# Patient Record
Sex: FEMALE | Race: WHITE | NOT HISPANIC OR LATINO
[De-identification: names, ages, dates, MRNs, and addresses within clinical notes are randomized per-mention and may not be internally consistent; named-entity substitution may affect disease eponyms.]

---

## 2018-01-31 ENCOUNTER — TRANSCRIPTION ENCOUNTER (OUTPATIENT)
Age: 38
End: 2018-01-31

## 2018-05-21 PROBLEM — Z00.00 ENCOUNTER FOR PREVENTIVE HEALTH EXAMINATION: Status: ACTIVE | Noted: 2018-05-21

## 2018-05-22 ENCOUNTER — TRANSCRIPTION ENCOUNTER (OUTPATIENT)
Age: 38
End: 2018-05-22

## 2018-05-22 ENCOUNTER — APPOINTMENT (OUTPATIENT)
Dept: ANTEPARTUM | Facility: CLINIC | Age: 38
End: 2018-05-22
Payer: COMMERCIAL

## 2018-05-22 ENCOUNTER — ASOB RESULT (OUTPATIENT)
Age: 38
End: 2018-05-22

## 2018-05-22 PROCEDURE — 76811 OB US DETAILED SNGL FETUS: CPT

## 2018-05-22 PROCEDURE — 76817 TRANSVAGINAL US OBSTETRIC: CPT | Mod: 59

## 2018-06-10 ENCOUNTER — OUTPATIENT (OUTPATIENT)
Dept: EMERGENCY DEPT | Facility: HOSPITAL | Age: 38
LOS: 1 days | End: 2018-06-10

## 2018-06-10 VITALS
OXYGEN SATURATION: 100 % | RESPIRATION RATE: 16 BRPM | DIASTOLIC BLOOD PRESSURE: 66 MMHG | SYSTOLIC BLOOD PRESSURE: 127 MMHG | TEMPERATURE: 99 F | HEART RATE: 81 BPM

## 2018-06-10 DIAGNOSIS — Z3A.00 WEEKS OF GESTATION OF PREGNANCY NOT SPECIFIED: ICD-10-CM

## 2018-06-10 DIAGNOSIS — O26.899 OTHER SPECIFIED PREGNANCY RELATED CONDITIONS, UNSPECIFIED TRIMESTER: ICD-10-CM

## 2018-06-10 NOTE — ED ADULT TRIAGE NOTE - CHIEF COMPLAINT QUOTE
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

## 2018-06-11 ENCOUNTER — TRANSCRIPTION ENCOUNTER (OUTPATIENT)
Age: 38
End: 2018-06-11

## 2018-06-11 ENCOUNTER — OUTPATIENT (OUTPATIENT)
Dept: OUTPATIENT SERVICES | Facility: HOSPITAL | Age: 38
LOS: 1 days | End: 2018-06-11

## 2018-06-11 VITALS
RESPIRATION RATE: 18 BRPM | SYSTOLIC BLOOD PRESSURE: 103 MMHG | TEMPERATURE: 98 F | HEART RATE: 90 BPM | DIASTOLIC BLOOD PRESSURE: 72 MMHG | OXYGEN SATURATION: 99 %

## 2018-06-11 DIAGNOSIS — O26.899 OTHER SPECIFIED PREGNANCY RELATED CONDITIONS, UNSPECIFIED TRIMESTER: ICD-10-CM

## 2018-06-11 DIAGNOSIS — R26.89 OTHER ABNORMALITIES OF GAIT AND MOBILITY: ICD-10-CM

## 2018-06-11 DIAGNOSIS — Z3A.00 WEEKS OF GESTATION OF PREGNANCY NOT SPECIFIED: ICD-10-CM

## 2018-06-11 LAB
APPEARANCE UR: CLEAR — SIGNIFICANT CHANGE UP
BACTERIA # UR AUTO: SIGNIFICANT CHANGE UP
BASOPHILS # BLD AUTO: 0.03 K/UL — SIGNIFICANT CHANGE UP (ref 0–0.2)
BASOPHILS NFR BLD AUTO: 0.2 % — SIGNIFICANT CHANGE UP (ref 0–2)
BILIRUB UR-MCNC: NEGATIVE — SIGNIFICANT CHANGE UP
BLD GP AB SCN SERPL QL: NEGATIVE — SIGNIFICANT CHANGE UP
BLOOD UR QL VISUAL: NEGATIVE — SIGNIFICANT CHANGE UP
COLOR SPEC: SIGNIFICANT CHANGE UP
EOSINOPHIL # BLD AUTO: 0.07 K/UL — SIGNIFICANT CHANGE UP (ref 0–0.5)
EOSINOPHIL NFR BLD AUTO: 0.5 % — SIGNIFICANT CHANGE UP (ref 0–6)
FIBRINOGEN PPP-MCNC: 683 MG/DL — HIGH (ref 310–510)
GLUCOSE UR-MCNC: NEGATIVE — SIGNIFICANT CHANGE UP
HCT VFR BLD CALC: 33.2 % — LOW (ref 34.5–45)
HGB BLD-MCNC: 10.8 G/DL — LOW (ref 11.5–15.5)
IMM GRANULOCYTES # BLD AUTO: 0.11 # — SIGNIFICANT CHANGE UP
IMM GRANULOCYTES NFR BLD AUTO: 0.7 % — SIGNIFICANT CHANGE UP (ref 0–1.5)
KETONES UR-MCNC: NEGATIVE — SIGNIFICANT CHANGE UP
LEUKOCYTE ESTERASE UR-ACNC: HIGH
LYMPHOCYTES # BLD AUTO: 18.5 % — SIGNIFICANT CHANGE UP (ref 13–44)
LYMPHOCYTES # BLD AUTO: 2.78 K/UL — SIGNIFICANT CHANGE UP (ref 1–3.3)
MCHC RBC-ENTMCNC: 28.3 PG — SIGNIFICANT CHANGE UP (ref 27–34)
MCHC RBC-ENTMCNC: 32.5 % — SIGNIFICANT CHANGE UP (ref 32–36)
MCV RBC AUTO: 87.1 FL — SIGNIFICANT CHANGE UP (ref 80–100)
MONOCYTES # BLD AUTO: 0.78 K/UL — SIGNIFICANT CHANGE UP (ref 0–0.9)
MONOCYTES NFR BLD AUTO: 5.2 % — SIGNIFICANT CHANGE UP (ref 2–14)
MUCOUS THREADS # UR AUTO: SIGNIFICANT CHANGE UP
NEUTROPHILS # BLD AUTO: 11.28 K/UL — HIGH (ref 1.8–7.4)
NEUTROPHILS NFR BLD AUTO: 74.9 % — SIGNIFICANT CHANGE UP (ref 43–77)
NITRITE UR-MCNC: NEGATIVE — SIGNIFICANT CHANGE UP
NRBC # FLD: 0 — SIGNIFICANT CHANGE UP
PH UR: 6.5 — SIGNIFICANT CHANGE UP (ref 4.6–8)
PLATELET # BLD AUTO: 242 K/UL — SIGNIFICANT CHANGE UP (ref 150–400)
PMV BLD: 11.1 FL — SIGNIFICANT CHANGE UP (ref 7–13)
PROT UR-MCNC: NEGATIVE MG/DL — SIGNIFICANT CHANGE UP
RBC # BLD: 3.81 M/UL — SIGNIFICANT CHANGE UP (ref 3.8–5.2)
RBC # FLD: 14.6 % — HIGH (ref 10.3–14.5)
RBC CASTS # UR COMP ASSIST: SIGNIFICANT CHANGE UP (ref 0–?)
RH IG SCN BLD-IMP: POSITIVE — SIGNIFICANT CHANGE UP
SP GR SPEC: 1.01 — SIGNIFICANT CHANGE UP (ref 1–1.04)
SQUAMOUS # UR AUTO: SIGNIFICANT CHANGE UP
T PALLIDUM AB TITR SER: NEGATIVE — SIGNIFICANT CHANGE UP
UROBILINOGEN FLD QL: NORMAL MG/DL — SIGNIFICANT CHANGE UP
WBC # BLD: 15.05 K/UL — HIGH (ref 3.8–10.5)
WBC # FLD AUTO: 15.05 K/UL — HIGH (ref 3.8–10.5)
WBC UR QL: HIGH (ref 0–?)

## 2018-06-11 RX ORDER — SODIUM CHLORIDE 9 MG/ML
1000 INJECTION, SOLUTION INTRAVENOUS
Qty: 0 | Refills: 0 | Status: DISCONTINUED | OUTPATIENT
Start: 2018-06-11 | End: 2018-06-11

## 2018-06-11 RX ADMIN — SODIUM CHLORIDE 250 MILLILITER(S): 9 INJECTION, SOLUTION INTRAVENOUS at 19:25

## 2018-06-11 RX ADMIN — SODIUM CHLORIDE 250 MILLILITER(S): 9 INJECTION, SOLUTION INTRAVENOUS at 09:55

## 2018-06-11 NOTE — ED PROVIDER NOTE - MEDICAL DECISION MAKING DETAILS
38 yo woman w/ fall. Only injuries noted are minor facial abrasions. Will check urine. Will require fetal monitoring in L+D. This patient had minor head injury (LOC or Amnesia to the event or Confusion) always with GCS 15. Because the pt doesn't have a HA, vomiting, is <64y/o, has no evidence of intox, no retrograde amnesia, no signs of basilar or depressed skull fracture, there is no need to CT the head based upon the Gonzales CT Head Rule. Because the pt is A&Ox3, has no focal neuro deficits, no posterior midline c-spine ttp, no evidence of intoxication and has no painful distracting injuries there is no need to obtain radiographic studies to evaluate the cervical spine based upon NEXUS Criteria.

## 2018-06-11 NOTE — ED ADULT NURSE NOTE - OBJECTIVE STATEMENT
pt on bed aox3 c/o pain on the nose, face and abdomen s/p fall, from the EMS truck, landed on her face/abdomen. abrasion noted on the nose, reports epistaxis that stops,  Pt is 24 weeks pregnant Denies Vaginal bleeding abdominal cramping MD at bedside eval the pt. will monitor

## 2018-06-11 NOTE — ED PROVIDER NOTE - PMH
Herniated disc, cervical    Hypothyroidism    Morbid obesity    Sleep apnea  has sleep studies in Jan 2015 and CPAP was recommended but Pt does not uses it

## 2018-06-11 NOTE — DISCHARGE NOTE ADULT - PATIENT PORTAL LINK FT
You can access the [facility] Patient Portal, offered by [facility], by registering with the following website: [email or web address]

## 2018-06-11 NOTE — DISCHARGE NOTE ADULT - CARE PROVIDER_API CALL
Bhupnider Bob), Obstetrics and Gynecology  [address] [place] [address]  Phone: [phone]  Fax: [phone]

## 2018-06-11 NOTE — ED PROVIDER NOTE - ENMT, MLM
Airway patent. No stridor. Minor abrasion on inner lower lip. Normal teeth alignment. No fractured teeth.

## 2018-06-11 NOTE — DISCHARGE NOTE ADULT - CARE PLAN
Principal Discharge DX:	Fall, initial encounter  Goal:	return to normal ADLs  Assessment and plan of treatment:	Regular diet as tolerated, regular activity as tolerated, no heavy lifting. Take tylenol as needed for pain control. Call your doctor and return to L&D if you have vaginal bleeding, LOF, or painful contractions.

## 2018-06-11 NOTE — ED PROVIDER NOTE - ATTENDING CONTRIBUTION TO CARE
36 y/o F  at 24 weeks gestation, h/o hypothyroidism here after mechanical fall.  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  Upon stepping down to the ground pt states she lost her balance and fell forwards, landing on her abdomen and face.  No LOC. 36 y/o F  at 24 weeks gestation, h/o hypothyroidism here after mechanical fall.  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  Upon stepping down to the ground pt states she lost her balance and fell forwards, landing on her abdomen and face.  No LOC.  pt was able to stand with assistance, weight bearing and walking since the fall.  Pt sustained abrasion to nose and states she bit the inside of her lower lip.  No abd pain, n/v, vaginal bleeding, headache, neck pain, weakness, or numbness.  Well appearing, sitting comfortably in stretcher, awake and alert, nontoxic.  VSS.  NCAT, EOMI, PERRl.  (+)abrasion to bridge and tip of nose, no epistaxis, no septal hematoma.  (+)small abrasion to chin, (+)small abrasion to lower inner lip, no laceration.  Teeth is intact, no malocclusion.  Facial bones intact, no tenderness, no laxity.  Neck is supple, FROM, no midline spinal tenderness.  Lungs cta bl.  Cards nl S1/S2, RRR, no MRG.  Abd soft ntnd.  Pelvis is stable, extremities with FROM and no gross deformities.  Gait is normal, no focal neuro deficits.  Pt with superficial abrasions, no concerns for other injuries.  She is UTD on tetanus, declines pain meds.  Will transfer to L&D for fetal evaluation.

## 2018-06-11 NOTE — DISCHARGE NOTE ADULT - PLAN OF CARE
return to normal ADLs Regular diet as tolerated, regular activity as tolerated, no heavy lifting. Take tylenol as needed for pain control. Call your doctor and return to L&D if you have vaginal bleeding, LOF, or painful contractions.

## 2018-06-11 NOTE — ED PROVIDER NOTE - MUSCULOSKELETAL, MLM
No spinal midline tenderness with palpation. All joints ranged without deformity or tenderness. No snuffbox tenderness in b/l wrists.

## 2018-06-11 NOTE — ED ADULT NURSE NOTE - CHIEF COMPLAINT QUOTE
employee-s/p mechanical fall out of ambulance, pt ~24 weeks pregnant, landed face down on abdomen, noted bleeding & swelling to bridge of nose and chin with laceration to inner bottom lip- pt denies LOC, blood thinner usage visual changes, abdominal pain, vaginal discharge/bleeding- awake a/ox3- L&D contacted, will stay in ED

## 2018-06-11 NOTE — ED PROVIDER NOTE - OBJECTIVE STATEMENT
38 yo woman  24wks pregnant, hypothyroid p/w fall. 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   OB: Bhupinder Bob 36 yo woman  24wks pregnant, hypothyroid p/w fall. 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 States she did not lose consciousness and was able to get up and walk afterward. Complaining of facial lacerations and pain in R wrist. Denies LOC, vomiting, abd pain, vaginal bleeding. Last tetanus 2 yrs ago.  OB: Bhupinder Bob

## 2018-06-11 NOTE — ED ADULT NURSE NOTE - NS ED NURSE NOTE DISPO AOU DETAILS FT
Spoken to L&D RN in triage. Pt to be transferred upstairs for fetal monitoring. Pt comfortable. no acute distress.

## 2018-06-12 LAB — SPECIMEN SOURCE: SIGNIFICANT CHANGE UP

## 2018-06-13 LAB — GP B STREP GENITAL QL CULT: SIGNIFICANT CHANGE UP

## 2018-07-24 PROBLEM — E03.9 HYPOTHYROIDISM, UNSPECIFIED: Chronic | Status: ACTIVE | Noted: 2018-06-11

## 2018-07-31 ENCOUNTER — ASOB RESULT (OUTPATIENT)
Age: 38
End: 2018-07-31

## 2018-07-31 ENCOUNTER — APPOINTMENT (OUTPATIENT)
Dept: MATERNAL FETAL MEDICINE | Facility: CLINIC | Age: 38
End: 2018-07-31
Payer: COMMERCIAL

## 2018-07-31 VITALS — BODY MASS INDEX: 37.11 KG/M2 | WEIGHT: 189 LBS | HEIGHT: 60 IN

## 2018-07-31 PROCEDURE — G0109 DIAB MANAGE TRN IND/GROUP: CPT

## 2018-08-01 ENCOUNTER — ASOB RESULT (OUTPATIENT)
Age: 38
End: 2018-08-01

## 2018-08-01 ENCOUNTER — APPOINTMENT (OUTPATIENT)
Dept: MATERNAL FETAL MEDICINE | Facility: CLINIC | Age: 38
End: 2018-08-01
Payer: COMMERCIAL

## 2018-08-01 PROCEDURE — G0109 DIAB MANAGE TRN IND/GROUP: CPT

## 2018-08-07 ENCOUNTER — APPOINTMENT (OUTPATIENT)
Dept: MATERNAL FETAL MEDICINE | Facility: CLINIC | Age: 38
End: 2018-08-07
Payer: COMMERCIAL

## 2018-08-07 ENCOUNTER — ASOB RESULT (OUTPATIENT)
Age: 38
End: 2018-08-07

## 2018-08-07 VITALS — WEIGHT: 198 LBS | BODY MASS INDEX: 38.67 KG/M2

## 2018-08-07 PROCEDURE — G0108 DIAB MANAGE TRN  PER INDIV: CPT

## 2018-08-21 ENCOUNTER — APPOINTMENT (OUTPATIENT)
Dept: MATERNAL FETAL MEDICINE | Facility: CLINIC | Age: 38
End: 2018-08-21
Payer: COMMERCIAL

## 2018-08-21 ENCOUNTER — ASOB RESULT (OUTPATIENT)
Age: 38
End: 2018-08-21

## 2018-08-21 PROCEDURE — G0108 DIAB MANAGE TRN  PER INDIV: CPT

## 2018-09-06 ENCOUNTER — ASOB RESULT (OUTPATIENT)
Age: 38
End: 2018-09-06

## 2018-09-06 ENCOUNTER — APPOINTMENT (OUTPATIENT)
Dept: ANTEPARTUM | Facility: CLINIC | Age: 38
End: 2018-09-06
Payer: COMMERCIAL

## 2018-09-06 ENCOUNTER — APPOINTMENT (OUTPATIENT)
Dept: MATERNAL FETAL MEDICINE | Facility: CLINIC | Age: 38
End: 2018-09-06
Payer: COMMERCIAL

## 2018-09-06 PROCEDURE — G0108 DIAB MANAGE TRN  PER INDIV: CPT

## 2018-09-06 PROCEDURE — 76816 OB US FOLLOW-UP PER FETUS: CPT

## 2018-09-06 PROCEDURE — 76819 FETAL BIOPHYS PROFIL W/O NST: CPT

## 2018-09-13 ENCOUNTER — OUTPATIENT (OUTPATIENT)
Dept: OUTPATIENT SERVICES | Facility: HOSPITAL | Age: 38
LOS: 1 days | End: 2018-09-13

## 2018-09-13 DIAGNOSIS — Z34.83 ENCOUNTER FOR SUPERVISION OF OTHER NORMAL PREGNANCY, THIRD TRIMESTER: ICD-10-CM

## 2018-09-13 LAB
APPEARANCE UR: CLEAR — SIGNIFICANT CHANGE UP
BACTERIA # UR AUTO: NEGATIVE — SIGNIFICANT CHANGE UP
BILIRUB UR-MCNC: NEGATIVE — SIGNIFICANT CHANGE UP
BLD GP AB SCN SERPL QL: NEGATIVE — SIGNIFICANT CHANGE UP
BLOOD UR QL VISUAL: NEGATIVE — SIGNIFICANT CHANGE UP
BUN SERPL-MCNC: 6 MG/DL — LOW (ref 7–23)
CALCIUM SERPL-MCNC: 8.9 MG/DL — SIGNIFICANT CHANGE UP (ref 8.4–10.5)
CHLORIDE SERPL-SCNC: 103 MMOL/L — SIGNIFICANT CHANGE UP (ref 98–107)
CO2 SERPL-SCNC: 23 MMOL/L — SIGNIFICANT CHANGE UP (ref 22–31)
COLOR SPEC: YELLOW — SIGNIFICANT CHANGE UP
CREAT SERPL-MCNC: 0.69 MG/DL — SIGNIFICANT CHANGE UP (ref 0.5–1.3)
GLUCOSE SERPL-MCNC: 60 MG/DL — LOW (ref 70–99)
GLUCOSE UR-MCNC: NEGATIVE — SIGNIFICANT CHANGE UP
HCT VFR BLD CALC: 31 % — LOW (ref 34.5–45)
HGB BLD-MCNC: 9.1 G/DL — LOW (ref 11.5–15.5)
HYALINE CASTS # UR AUTO: NEGATIVE — SIGNIFICANT CHANGE UP
KETONES UR-MCNC: NEGATIVE — SIGNIFICANT CHANGE UP
LEUKOCYTE ESTERASE UR-ACNC: SIGNIFICANT CHANGE UP
MCHC RBC-ENTMCNC: 24.2 PG — LOW (ref 27–34)
MCHC RBC-ENTMCNC: 29.4 % — LOW (ref 32–36)
MCV RBC AUTO: 82.4 FL — SIGNIFICANT CHANGE UP (ref 80–100)
NITRITE UR-MCNC: NEGATIVE — SIGNIFICANT CHANGE UP
NRBC # FLD: 0 — SIGNIFICANT CHANGE UP
PH UR: 7.5 — SIGNIFICANT CHANGE UP (ref 5–8)
PLATELET # BLD AUTO: 266 K/UL — SIGNIFICANT CHANGE UP (ref 150–400)
PMV BLD: 12.5 FL — SIGNIFICANT CHANGE UP (ref 7–13)
POTASSIUM SERPL-MCNC: 3.8 MMOL/L — SIGNIFICANT CHANGE UP (ref 3.5–5.3)
POTASSIUM SERPL-SCNC: 3.8 MMOL/L — SIGNIFICANT CHANGE UP (ref 3.5–5.3)
PROT UR-MCNC: 20 — SIGNIFICANT CHANGE UP
RBC # BLD: 3.76 M/UL — LOW (ref 3.8–5.2)
RBC # FLD: 15.2 % — HIGH (ref 10.3–14.5)
RBC CASTS # UR COMP ASSIST: SIGNIFICANT CHANGE UP (ref 0–?)
RH IG SCN BLD-IMP: POSITIVE — SIGNIFICANT CHANGE UP
SODIUM SERPL-SCNC: 138 MMOL/L — SIGNIFICANT CHANGE UP (ref 135–145)
SP GR SPEC: 1.02 — SIGNIFICANT CHANGE UP (ref 1–1.04)
SQUAMOUS # UR AUTO: SIGNIFICANT CHANGE UP
UROBILINOGEN FLD QL: NORMAL — SIGNIFICANT CHANGE UP
WBC # BLD: 12.15 K/UL — HIGH (ref 3.8–10.5)
WBC # FLD AUTO: 12.15 K/UL — HIGH (ref 3.8–10.5)
WBC UR QL: SIGNIFICANT CHANGE UP (ref 0–?)

## 2018-09-14 LAB — T PALLIDUM AB TITR SER: NEGATIVE — SIGNIFICANT CHANGE UP

## 2018-09-17 ENCOUNTER — TRANSCRIPTION ENCOUNTER (OUTPATIENT)
Age: 38
End: 2018-09-17

## 2018-09-17 ENCOUNTER — INPATIENT (INPATIENT)
Facility: HOSPITAL | Age: 38
LOS: 2 days | Discharge: HOME | End: 2018-09-20
Attending: SPECIALIST | Admitting: SPECIALIST

## 2018-09-17 VITALS — HEIGHT: 60 IN | WEIGHT: 207.23 LBS

## 2018-09-17 DIAGNOSIS — Z3A.00 WEEKS OF GESTATION OF PREGNANCY NOT SPECIFIED: ICD-10-CM

## 2018-09-17 DIAGNOSIS — O26.899 OTHER SPECIFIED PREGNANCY RELATED CONDITIONS, UNSPECIFIED TRIMESTER: ICD-10-CM

## 2018-09-17 LAB — GLUCOSE BLDC GLUCOMTR-MCNC: 98 MG/DL — SIGNIFICANT CHANGE UP (ref 70–99)

## 2018-09-17 RX ORDER — FAMOTIDINE 10 MG/ML
20 INJECTION INTRAVENOUS ONCE
Qty: 0 | Refills: 0 | Status: COMPLETED | OUTPATIENT
Start: 2018-09-17 | End: 2018-09-17

## 2018-09-17 RX ORDER — DIPHENHYDRAMINE HCL 50 MG
25 CAPSULE ORAL EVERY 4 HOURS
Qty: 0 | Refills: 0 | Status: DISCONTINUED | OUTPATIENT
Start: 2018-09-18 | End: 2018-09-19

## 2018-09-17 RX ORDER — CITRIC ACID/SODIUM CITRATE 300-500 MG
30 SOLUTION, ORAL ORAL ONCE
Qty: 0 | Refills: 0 | Status: DISCONTINUED | OUTPATIENT
Start: 2018-09-17 | End: 2018-09-17

## 2018-09-17 RX ORDER — NALOXONE HYDROCHLORIDE 4 MG/.1ML
0.1 SPRAY NASAL
Qty: 0 | Refills: 0 | Status: DISCONTINUED | OUTPATIENT
Start: 2018-09-18 | End: 2018-09-19

## 2018-09-17 RX ORDER — OXYCODONE HYDROCHLORIDE 5 MG/1
5 TABLET ORAL
Qty: 0 | Refills: 0 | Status: DISCONTINUED | OUTPATIENT
Start: 2018-09-18 | End: 2018-09-19

## 2018-09-17 RX ORDER — SODIUM CHLORIDE 9 MG/ML
1000 INJECTION, SOLUTION INTRAVENOUS
Qty: 0 | Refills: 0 | Status: DISCONTINUED | OUTPATIENT
Start: 2018-09-17 | End: 2018-09-17

## 2018-09-17 RX ORDER — SODIUM CHLORIDE 9 MG/ML
1000 INJECTION, SOLUTION INTRAVENOUS ONCE
Qty: 0 | Refills: 0 | Status: COMPLETED | OUTPATIENT
Start: 2018-09-17 | End: 2018-09-17

## 2018-09-17 RX ORDER — HYDROMORPHONE HYDROCHLORIDE 2 MG/ML
1 INJECTION INTRAMUSCULAR; INTRAVENOUS; SUBCUTANEOUS
Qty: 0 | Refills: 0 | Status: DISCONTINUED | OUTPATIENT
Start: 2018-09-18 | End: 2018-09-19

## 2018-09-17 RX ORDER — OXYCODONE HYDROCHLORIDE 5 MG/1
10 TABLET ORAL
Qty: 0 | Refills: 0 | Status: DISCONTINUED | OUTPATIENT
Start: 2018-09-18 | End: 2018-09-19

## 2018-09-17 RX ORDER — INFLUENZA VIRUS VACCINE 15; 15; 15; 15 UG/.5ML; UG/.5ML; UG/.5ML; UG/.5ML
0.5 SUSPENSION INTRAMUSCULAR ONCE
Qty: 0 | Refills: 0 | Status: COMPLETED | OUTPATIENT
Start: 2018-09-17 | End: 2018-09-19

## 2018-09-17 RX ORDER — ONDANSETRON 8 MG/1
4 TABLET, FILM COATED ORAL EVERY 6 HOURS
Qty: 0 | Refills: 0 | Status: DISCONTINUED | OUTPATIENT
Start: 2018-09-18 | End: 2018-09-19

## 2018-09-17 RX ORDER — METOCLOPRAMIDE HCL 10 MG
10 TABLET ORAL ONCE
Qty: 0 | Refills: 0 | Status: COMPLETED | OUTPATIENT
Start: 2018-09-17 | End: 2018-09-17

## 2018-09-17 RX ADMIN — Medication 30 MILLILITER(S): at 22:45

## 2018-09-17 RX ADMIN — Medication 10 MILLIGRAM(S): at 22:24

## 2018-09-17 RX ADMIN — FAMOTIDINE 20 MILLIGRAM(S): 10 INJECTION INTRAVENOUS at 22:24

## 2018-09-17 RX ADMIN — SODIUM CHLORIDE 2000 MILLILITER(S): 9 INJECTION, SOLUTION INTRAVENOUS at 22:25

## 2018-09-17 NOTE — DISCHARGE NOTE OB - CARE PLAN
Principal Discharge DX:	 delivery delivered  Goal:	recovery  Assessment and plan of treatment:	regular

## 2018-09-17 NOTE — DISCHARGE NOTE OB - PATIENT PORTAL LINK FT
You can access the [email or web address] Portal, offered by [facility], by registering with the following website: [email or web address]

## 2018-09-17 NOTE — DISCHARGE NOTE OB - CARE PROVIDER_API CALL
Bhupinder Bob), Obstetrics and Gynecology  [address] [place] [address]  Phone: [phone]  Fax: [phone]

## 2018-09-17 NOTE — DISCHARGE NOTE OB - MEDICATION SUMMARY - MEDICATIONS TO TAKE
I will START or STAY ON the medications listed below when I get home from the hospital:  None I will START or STAY ON the medications listed below when I get home from the hospital:    acetaminophen 325 mg oral tablet  -- 3 tab(s) by mouth every 6 hours  -- Indication: For for pain    Prenatal Multivitamins with Folic Acid 1 mg oral tablet  -- 1 tab(s) by mouth once a day  -- Indication: For supplement    levothyroxine 50 mcg (0.05 mg) oral tablet  -- 1 tab(s) by mouth once a day  -- Indication: For hypothyroid

## 2018-09-18 LAB
BASOPHILS # BLD AUTO: 0.01 K/UL — SIGNIFICANT CHANGE UP (ref 0–0.2)
BASOPHILS # BLD AUTO: 0.02 K/UL — SIGNIFICANT CHANGE UP (ref 0–0.2)
BASOPHILS NFR BLD AUTO: 0.1 % — SIGNIFICANT CHANGE UP (ref 0–2)
BASOPHILS NFR BLD AUTO: 0.1 % — SIGNIFICANT CHANGE UP (ref 0–2)
BLD GP AB SCN SERPL QL: NEGATIVE — SIGNIFICANT CHANGE UP
EOSINOPHIL # BLD AUTO: 0.02 K/UL — SIGNIFICANT CHANGE UP (ref 0–0.5)
EOSINOPHIL # BLD AUTO: 0.08 K/UL — SIGNIFICANT CHANGE UP (ref 0–0.5)
EOSINOPHIL NFR BLD AUTO: 0.1 % — SIGNIFICANT CHANGE UP (ref 0–6)
EOSINOPHIL NFR BLD AUTO: 0.6 % — SIGNIFICANT CHANGE UP (ref 0–6)
GLUCOSE BLDC GLUCOMTR-MCNC: 109 MG/DL — HIGH (ref 70–99)
GLUCOSE BLDC GLUCOMTR-MCNC: 125 MG/DL — HIGH (ref 70–99)
GLUCOSE BLDC GLUCOMTR-MCNC: 131 MG/DL — HIGH (ref 70–99)
GLUCOSE BLDC GLUCOMTR-MCNC: 135 MG/DL — HIGH (ref 70–99)
HCT VFR BLD CALC: 22.8 % — LOW (ref 34.5–45)
HCT VFR BLD CALC: 26.3 % — LOW (ref 34.5–45)
HCT VFR BLD CALC: 28.1 % — LOW (ref 34.5–45)
HGB BLD-MCNC: 7.1 G/DL — LOW (ref 11.5–15.5)
HGB BLD-MCNC: 8.1 G/DL — LOW (ref 11.5–15.5)
HGB BLD-MCNC: 8.6 G/DL — LOW (ref 11.5–15.5)
IMM GRANULOCYTES # BLD AUTO: 0.08 # — SIGNIFICANT CHANGE UP
IMM GRANULOCYTES # BLD AUTO: 0.09 # — SIGNIFICANT CHANGE UP
IMM GRANULOCYTES NFR BLD AUTO: 0.6 % — SIGNIFICANT CHANGE UP (ref 0–1.5)
IMM GRANULOCYTES NFR BLD AUTO: 0.6 % — SIGNIFICANT CHANGE UP (ref 0–1.5)
LYMPHOCYTES # BLD AUTO: 1.78 K/UL — SIGNIFICANT CHANGE UP (ref 1–3.3)
LYMPHOCYTES # BLD AUTO: 12.4 % — LOW (ref 13–44)
LYMPHOCYTES # BLD AUTO: 18.5 % — SIGNIFICANT CHANGE UP (ref 13–44)
LYMPHOCYTES # BLD AUTO: 2.45 K/UL — SIGNIFICANT CHANGE UP (ref 1–3.3)
MCHC RBC-ENTMCNC: 24.3 PG — LOW (ref 27–34)
MCHC RBC-ENTMCNC: 24.7 PG — LOW (ref 27–34)
MCHC RBC-ENTMCNC: 24.9 PG — LOW (ref 27–34)
MCHC RBC-ENTMCNC: 30.6 % — LOW (ref 32–36)
MCHC RBC-ENTMCNC: 30.8 % — LOW (ref 32–36)
MCHC RBC-ENTMCNC: 31.1 % — LOW (ref 32–36)
MCV RBC AUTO: 79.2 FL — LOW (ref 80–100)
MCV RBC AUTO: 79.4 FL — LOW (ref 80–100)
MCV RBC AUTO: 80.9 FL — SIGNIFICANT CHANGE UP (ref 80–100)
MONOCYTES # BLD AUTO: 0.68 K/UL — SIGNIFICANT CHANGE UP (ref 0–0.9)
MONOCYTES # BLD AUTO: 0.73 K/UL — SIGNIFICANT CHANGE UP (ref 0–0.9)
MONOCYTES NFR BLD AUTO: 5.1 % — SIGNIFICANT CHANGE UP (ref 2–14)
MONOCYTES NFR BLD AUTO: 5.1 % — SIGNIFICANT CHANGE UP (ref 2–14)
NEUTROPHILS # BLD AUTO: 11.77 K/UL — HIGH (ref 1.8–7.4)
NEUTROPHILS # BLD AUTO: 9.91 K/UL — HIGH (ref 1.8–7.4)
NEUTROPHILS NFR BLD AUTO: 75.1 % — SIGNIFICANT CHANGE UP (ref 43–77)
NEUTROPHILS NFR BLD AUTO: 81.7 % — HIGH (ref 43–77)
NRBC # FLD: 0 — SIGNIFICANT CHANGE UP
PLATELET # BLD AUTO: 209 K/UL — SIGNIFICANT CHANGE UP (ref 150–400)
PLATELET # BLD AUTO: 216 K/UL — SIGNIFICANT CHANGE UP (ref 150–400)
PLATELET # BLD AUTO: 220 K/UL — SIGNIFICANT CHANGE UP (ref 150–400)
PMV BLD: 11.5 FL — SIGNIFICANT CHANGE UP (ref 7–13)
PMV BLD: 11.7 FL — SIGNIFICANT CHANGE UP (ref 7–13)
PMV BLD: 11.9 FL — SIGNIFICANT CHANGE UP (ref 7–13)
RBC # BLD: 2.88 M/UL — LOW (ref 3.8–5.2)
RBC # BLD: 3.25 M/UL — LOW (ref 3.8–5.2)
RBC # BLD: 3.54 M/UL — LOW (ref 3.8–5.2)
RBC # FLD: 15.5 % — HIGH (ref 10.3–14.5)
RBC # FLD: 15.6 % — HIGH (ref 10.3–14.5)
RBC # FLD: 15.8 % — HIGH (ref 10.3–14.5)
RH IG SCN BLD-IMP: POSITIVE — SIGNIFICANT CHANGE UP
WBC # BLD: 13.21 K/UL — HIGH (ref 3.8–10.5)
WBC # BLD: 14.41 K/UL — HIGH (ref 3.8–10.5)
WBC # BLD: 17.05 K/UL — HIGH (ref 3.8–10.5)
WBC # FLD AUTO: 13.21 K/UL — HIGH (ref 3.8–10.5)
WBC # FLD AUTO: 14.41 K/UL — HIGH (ref 3.8–10.5)
WBC # FLD AUTO: 17.05 K/UL — HIGH (ref 3.8–10.5)

## 2018-09-18 RX ORDER — LANOLIN
1 OINTMENT (GRAM) TOPICAL
Qty: 0 | Refills: 0 | Status: DISCONTINUED | OUTPATIENT
Start: 2018-09-18 | End: 2018-09-20

## 2018-09-18 RX ORDER — OXYTOCIN 10 UNIT/ML
333.33 VIAL (ML) INJECTION
Qty: 20 | Refills: 0 | Status: DISCONTINUED | OUTPATIENT
Start: 2018-09-18 | End: 2018-09-18

## 2018-09-18 RX ORDER — ACETAMINOPHEN 500 MG
975 TABLET ORAL EVERY 6 HOURS
Qty: 0 | Refills: 0 | Status: DISCONTINUED | OUTPATIENT
Start: 2018-09-18 | End: 2018-09-20

## 2018-09-18 RX ORDER — SODIUM CHLORIDE 9 MG/ML
1000 INJECTION, SOLUTION INTRAVENOUS
Qty: 0 | Refills: 0 | Status: DISCONTINUED | OUTPATIENT
Start: 2018-09-18 | End: 2018-09-19

## 2018-09-18 RX ORDER — TETANUS TOXOID, REDUCED DIPHTHERIA TOXOID AND ACELLULAR PERTUSSIS VACCINE, ADSORBED 5; 2.5; 8; 8; 2.5 [IU]/.5ML; [IU]/.5ML; UG/.5ML; UG/.5ML; UG/.5ML
0.5 SUSPENSION INTRAMUSCULAR ONCE
Qty: 0 | Refills: 0 | Status: COMPLETED | OUTPATIENT
Start: 2018-09-18

## 2018-09-18 RX ORDER — SODIUM CHLORIDE 9 MG/ML
500 INJECTION, SOLUTION INTRAVENOUS ONCE
Qty: 0 | Refills: 0 | Status: DISCONTINUED | OUTPATIENT
Start: 2018-09-18 | End: 2018-09-20

## 2018-09-18 RX ORDER — SIMETHICONE 80 MG/1
80 TABLET, CHEWABLE ORAL EVERY 4 HOURS
Qty: 0 | Refills: 0 | Status: DISCONTINUED | OUTPATIENT
Start: 2018-09-18 | End: 2018-09-20

## 2018-09-18 RX ORDER — HEPARIN SODIUM 5000 [USP'U]/ML
10000 INJECTION INTRAVENOUS; SUBCUTANEOUS
Qty: 0 | Refills: 0 | Status: DISCONTINUED | OUTPATIENT
Start: 2018-09-18 | End: 2018-09-20

## 2018-09-18 RX ORDER — DOCUSATE SODIUM 100 MG
100 CAPSULE ORAL
Qty: 0 | Refills: 0 | Status: DISCONTINUED | OUTPATIENT
Start: 2018-09-18 | End: 2018-09-19

## 2018-09-18 RX ORDER — DIPHENHYDRAMINE HCL 50 MG
25 CAPSULE ORAL EVERY 6 HOURS
Qty: 0 | Refills: 0 | Status: DISCONTINUED | OUTPATIENT
Start: 2018-09-18 | End: 2018-09-20

## 2018-09-18 RX ORDER — LEVOTHYROXINE SODIUM 125 MCG
50 TABLET ORAL DAILY
Qty: 0 | Refills: 0 | Status: DISCONTINUED | OUTPATIENT
Start: 2018-09-18 | End: 2018-09-20

## 2018-09-18 RX ORDER — OXYCODONE HYDROCHLORIDE 5 MG/1
5 TABLET ORAL
Qty: 0 | Refills: 0 | Status: COMPLETED | OUTPATIENT
Start: 2018-09-18 | End: 2018-09-25

## 2018-09-18 RX ORDER — OXYCODONE HYDROCHLORIDE 5 MG/1
5 TABLET ORAL EVERY 4 HOURS
Qty: 0 | Refills: 0 | Status: COMPLETED | OUTPATIENT
Start: 2018-09-18 | End: 2018-09-25

## 2018-09-18 RX ORDER — KETOROLAC TROMETHAMINE 30 MG/ML
30 SYRINGE (ML) INJECTION EVERY 6 HOURS
Qty: 0 | Refills: 0 | Status: DISCONTINUED | OUTPATIENT
Start: 2018-09-18 | End: 2018-09-19

## 2018-09-18 RX ORDER — IBUPROFEN 200 MG
600 TABLET ORAL EVERY 6 HOURS
Qty: 0 | Refills: 0 | Status: COMPLETED | OUTPATIENT
Start: 2018-09-18 | End: 2019-08-17

## 2018-09-18 RX ORDER — GLYCERIN ADULT
1 SUPPOSITORY, RECTAL RECTAL AT BEDTIME
Qty: 0 | Refills: 0 | Status: DISCONTINUED | OUTPATIENT
Start: 2018-09-18 | End: 2018-09-20

## 2018-09-18 RX ORDER — SODIUM CHLORIDE 9 MG/ML
1000 INJECTION, SOLUTION INTRAVENOUS
Qty: 0 | Refills: 0 | Status: DISCONTINUED | OUTPATIENT
Start: 2018-09-18 | End: 2018-09-18

## 2018-09-18 RX ORDER — OXYTOCIN 10 UNIT/ML
41.67 VIAL (ML) INJECTION
Qty: 20 | Refills: 0 | Status: DISCONTINUED | OUTPATIENT
Start: 2018-09-18 | End: 2018-09-18

## 2018-09-18 RX ORDER — FERROUS SULFATE 325(65) MG
325 TABLET ORAL THREE TIMES A DAY
Qty: 0 | Refills: 0 | Status: DISCONTINUED | OUTPATIENT
Start: 2018-09-18 | End: 2018-09-20

## 2018-09-18 RX ORDER — DOCUSATE SODIUM 100 MG
100 CAPSULE ORAL
Qty: 0 | Refills: 0 | Status: DISCONTINUED | OUTPATIENT
Start: 2018-09-18 | End: 2018-09-18

## 2018-09-18 RX ORDER — ASCORBIC ACID 60 MG
500 TABLET,CHEWABLE ORAL DAILY
Qty: 0 | Refills: 0 | Status: DISCONTINUED | OUTPATIENT
Start: 2018-09-18 | End: 2018-09-20

## 2018-09-18 RX ORDER — FERROUS SULFATE 325(65) MG
325 TABLET ORAL DAILY
Qty: 0 | Refills: 0 | Status: DISCONTINUED | OUTPATIENT
Start: 2018-09-18 | End: 2018-09-18

## 2018-09-18 RX ADMIN — Medication 30 MILLIGRAM(S): at 17:46

## 2018-09-18 RX ADMIN — Medication 30 MILLIGRAM(S): at 12:13

## 2018-09-18 RX ADMIN — Medication 500 MILLIGRAM(S): at 18:26

## 2018-09-18 RX ADMIN — Medication 100 MILLIGRAM(S): at 06:29

## 2018-09-18 RX ADMIN — SIMETHICONE 80 MILLIGRAM(S): 80 TABLET, CHEWABLE ORAL at 06:29

## 2018-09-18 RX ADMIN — Medication 30 MILLIGRAM(S): at 12:40

## 2018-09-18 RX ADMIN — Medication 50 MICROGRAM(S): at 06:29

## 2018-09-18 RX ADMIN — Medication 30 MILLIGRAM(S): at 06:28

## 2018-09-18 RX ADMIN — Medication 30 MILLIGRAM(S): at 18:42

## 2018-09-18 RX ADMIN — ONDANSETRON 4 MILLIGRAM(S): 8 TABLET, FILM COATED ORAL at 09:18

## 2018-09-18 RX ADMIN — HEPARIN SODIUM 10000 UNIT(S): 5000 INJECTION INTRAVENOUS; SUBCUTANEOUS at 06:28

## 2018-09-18 RX ADMIN — Medication 30 MILLIGRAM(S): at 06:58

## 2018-09-18 RX ADMIN — HEPARIN SODIUM 10000 UNIT(S): 5000 INJECTION INTRAVENOUS; SUBCUTANEOUS at 17:46

## 2018-09-18 RX ADMIN — Medication 325 MILLIGRAM(S): at 18:26

## 2018-09-18 NOTE — PROVIDER CONTACT NOTE (OTHER) - BACKGROUND
C/S from 9/17, . Vitals stable, see flowsheet. GDM A2, on humalog. Urine output adequate, 100 cc per hour, after 1 L bolus this AM. Patient asymptomatic.

## 2018-09-18 NOTE — PROVIDER CONTACT NOTE (OTHER) - RECOMMENDATIONS
Zofran 4mg given for nausea/vomiting. Patient encouraged to increase PO intake as tolerated. 500cc bolus to be administered.

## 2018-09-18 NOTE — PROVIDER CONTACT NOTE (OTHER) - ASSESSMENT
Fundus firm, bleeding light. Urine dark and concetrated. Patient had 3 episodes of emesis since admit to floor, approx 300cc.

## 2018-09-18 NOTE — PROGRESS NOTE ADULT - SUBJECTIVE AND OBJECTIVE BOX
OB Progress Note:  Delivery, POD#1    S: 39yo POD#1 s/p LTCS . Her pain is well controlled. She is tolerating a regular diet and passing flatus. Denies N/V. Denies CP/SOB/lightheadedness/dizziness.   She is ambulating without difficulty. Renteria in place.    O:   Vital Signs Last 24 Hrs  T(C): 36.5 (18 Sep 2018 06:49), Max: 36.9 (18 Sep 2018 03:29)  T(F): 97.7 (18 Sep 2018 06:49), Max: 98.4 (18 Sep 2018 03:29)  HR: 80 (18 Sep 2018 06:49) (64 - 93)  BP: 108/59 (18 Sep 2018 06:49) (101/63 - 124/67)  BP(mean): 63 (18 Sep 2018 02:45) (63 - 98)  RR: 18 (18 Sep 2018 06:49) (16 - 21)  SpO2: 98% (18 Sep 2018 06:49) (95% - 100%)    Labs:  Blood type: B Positive  Rubella IgG: RPR: Negative                          8.6<L>   17.05<H> >-----------< 220    (  @ 06:00 )             28.1<L>      PE:  General: NAD  Abdomen: Mildly distended, appropriately tender, incision c/d/i.  Extremities: No erythema, no pitting edema

## 2018-09-18 NOTE — PROVIDER CONTACT NOTE (OTHER) - ASSESSMENT
Fundus firm, bleeding moderate. ABD soft. Urine output adequate. Voiding adequately. Patient denies dizziness. Patient on iron TID and vitamin C.

## 2018-09-18 NOTE — PROGRESS NOTE ADULT - SUBJECTIVE AND OBJECTIVE BOX
Patient is 38y  old.    Event : decreased H/H    Vital Signs Last 24 Hrs  T(C): 37 (18 Sep 2018 18:22), Max: 37 (18 Sep 2018 18:22)  T(F): 98.6 (18 Sep 2018 18:22), Max: 98.6 (18 Sep 2018 18:22)  HR: 87 (18 Sep 2018 18:22) (64 - 93)  BP: 102/73 (18 Sep 2018 18:22) (92/54 - 124/67)  BP(mean): 63 (18 Sep 2018 02:45) (63 - 98)  RR: 18 (18 Sep 2018 18:22) (16 - 21)  SpO2: 97% (18 Sep 2018 18:22) (95% - 100%)    I&O's Detail    17 Sep 2018 07:01  -  18 Sep 2018 07:00  --------------------------------------------------------  IN:    Other: 2500 mL  Total IN: 2500 mL    OUT:    Estimated Blood Loss: 800 mL    Indwelling Catheter - Urethral: 470 mL  Total OUT: 1270 mL    Total NET: 1230 mL      18 Sep 2018 07:01  -  18 Sep 2018 18:48  --------------------------------------------------------  IN:    lactated ringers.: 1000 mL  Total IN: 1000 mL    OUT:    Emesis: 300 mL    Indwelling Catheter - Urethral: 400 mL  Total OUT: 700 mL    Total NET: 300 mL          Labs:                        7.1    13.21 )-----------( 216      ( 18 Sep 2018 18:10 )             22.8                         8.1    14.41 )-----------( 209      ( 18 Sep 2018 10:35 )             26.3                         8.6    17.05 )-----------( 220      ( 18 Sep 2018 06:00 )             28.1             Fibrinogen:     Lactate:       Evaluation : s/p C/section POPD# 1   Patient evaluated for decresed H/H 7.1/22.8    She is asymptomatic, looks little pale . No c/o HA,SOB, visual disturbances, palpitation or tiredness  Heart RRR, Lungs clear  Abdomen soft approprietly tender  Incision clean an dry  Foly draining clear urine now, recived 1000cc bolus this morning for decreased urine output.   Vitals stable     Management and Follow-up plan :  Ferous sulfate  Colace and Vit C  discussed with Dr. Almas mukherjee   Will repeat CBC later  Will discuss with Dr moon   will continue to monitor her closely            -------------------------------------------------------------------------------------------------------------

## 2018-09-18 NOTE — PROGRESS NOTE ADULT - SUBJECTIVE AND OBJECTIVE BOX
Postop Day  __1_ s/p   C- Section    THERAPY:  [ x ] Spinal morphine   [ x ] Epidural morphine   [  ] IV PCA Hydromorphone 1 mg/ml    Refer to Charted Pain Scores for Pain Scale score    Sedation Score:	  Alert	    Side Effects:	   None	     Gross Neurological Exam within normal limits    ASSESSMENT/ PLAN     Documentation and Verification of current medications complete.  Change to PRN PO Analgesics

## 2018-09-19 LAB
BASOPHILS # BLD AUTO: 0.02 K/UL — SIGNIFICANT CHANGE UP (ref 0–0.2)
BASOPHILS NFR BLD AUTO: 0.1 % — SIGNIFICANT CHANGE UP (ref 0–2)
EOSINOPHIL # BLD AUTO: 0.15 K/UL — SIGNIFICANT CHANGE UP (ref 0–0.5)
EOSINOPHIL NFR BLD AUTO: 1 % — SIGNIFICANT CHANGE UP (ref 0–6)
GLUCOSE BLDC GLUCOMTR-MCNC: 109 MG/DL — HIGH (ref 70–99)
HCT VFR BLD CALC: 21.2 % — LOW (ref 34.5–45)
HCT VFR BLD CALC: 27.9 % — LOW (ref 34.5–45)
HGB BLD-MCNC: 6.4 G/DL — CRITICAL LOW (ref 11.5–15.5)
HGB BLD-MCNC: 8.6 G/DL — LOW (ref 11.5–15.5)
IMM GRANULOCYTES # BLD AUTO: 0.11 # — SIGNIFICANT CHANGE UP
IMM GRANULOCYTES NFR BLD AUTO: 0.8 % — SIGNIFICANT CHANGE UP (ref 0–1.5)
LYMPHOCYTES # BLD AUTO: 21 % — SIGNIFICANT CHANGE UP (ref 13–44)
LYMPHOCYTES # BLD AUTO: 3 K/UL — SIGNIFICANT CHANGE UP (ref 1–3.3)
MCHC RBC-ENTMCNC: 24.2 PG — LOW (ref 27–34)
MCHC RBC-ENTMCNC: 25.3 PG — LOW (ref 27–34)
MCHC RBC-ENTMCNC: 30.2 % — LOW (ref 32–36)
MCHC RBC-ENTMCNC: 30.8 % — LOW (ref 32–36)
MCV RBC AUTO: 80 FL — SIGNIFICANT CHANGE UP (ref 80–100)
MCV RBC AUTO: 82.1 FL — SIGNIFICANT CHANGE UP (ref 80–100)
MONOCYTES # BLD AUTO: 0.63 K/UL — SIGNIFICANT CHANGE UP (ref 0–0.9)
MONOCYTES NFR BLD AUTO: 4.4 % — SIGNIFICANT CHANGE UP (ref 2–14)
NEUTROPHILS # BLD AUTO: 10.39 K/UL — HIGH (ref 1.8–7.4)
NEUTROPHILS NFR BLD AUTO: 72.7 % — SIGNIFICANT CHANGE UP (ref 43–77)
NRBC # FLD: 0 — SIGNIFICANT CHANGE UP
NRBC # FLD: 0 — SIGNIFICANT CHANGE UP
PLATELET # BLD AUTO: 201 K/UL — SIGNIFICANT CHANGE UP (ref 150–400)
PLATELET # BLD AUTO: 228 K/UL — SIGNIFICANT CHANGE UP (ref 150–400)
PMV BLD: 11.8 FL — SIGNIFICANT CHANGE UP (ref 7–13)
PMV BLD: 12.1 FL — SIGNIFICANT CHANGE UP (ref 7–13)
RBC # BLD: 2.65 M/UL — LOW (ref 3.8–5.2)
RBC # BLD: 3.4 M/UL — LOW (ref 3.8–5.2)
RBC # FLD: 15.5 % — HIGH (ref 10.3–14.5)
RBC # FLD: 15.6 % — HIGH (ref 10.3–14.5)
WBC # BLD: 12.21 K/UL — HIGH (ref 3.8–10.5)
WBC # BLD: 14.3 K/UL — HIGH (ref 3.8–10.5)
WBC # FLD AUTO: 12.21 K/UL — HIGH (ref 3.8–10.5)
WBC # FLD AUTO: 14.3 K/UL — HIGH (ref 3.8–10.5)

## 2018-09-19 RX ORDER — OXYCODONE HYDROCHLORIDE 5 MG/1
5 TABLET ORAL EVERY 4 HOURS
Qty: 0 | Refills: 0 | Status: DISCONTINUED | OUTPATIENT
Start: 2018-09-19 | End: 2018-09-20

## 2018-09-19 RX ORDER — TETANUS TOXOID, REDUCED DIPHTHERIA TOXOID AND ACELLULAR PERTUSSIS VACCINE, ADSORBED 5; 2.5; 8; 8; 2.5 [IU]/.5ML; [IU]/.5ML; UG/.5ML; UG/.5ML; UG/.5ML
0.5 SUSPENSION INTRAMUSCULAR ONCE
Qty: 0 | Refills: 0 | Status: COMPLETED | OUTPATIENT
Start: 2018-09-19 | End: 2018-09-19

## 2018-09-19 RX ORDER — OXYCODONE HYDROCHLORIDE 5 MG/1
5 TABLET ORAL
Qty: 0 | Refills: 0 | Status: DISCONTINUED | OUTPATIENT
Start: 2018-09-19 | End: 2018-09-20

## 2018-09-19 RX ORDER — ACETAMINOPHEN 500 MG
975 TABLET ORAL ONCE
Qty: 0 | Refills: 0 | Status: COMPLETED | OUTPATIENT
Start: 2018-09-19 | End: 2018-09-19

## 2018-09-19 RX ORDER — DIPHENHYDRAMINE HCL 50 MG
25 CAPSULE ORAL ONCE
Qty: 0 | Refills: 0 | Status: COMPLETED | OUTPATIENT
Start: 2018-09-19 | End: 2018-09-19

## 2018-09-19 RX ORDER — IBUPROFEN 200 MG
600 TABLET ORAL EVERY 6 HOURS
Qty: 0 | Refills: 0 | Status: DISCONTINUED | OUTPATIENT
Start: 2018-09-19 | End: 2018-09-19

## 2018-09-19 RX ORDER — DOCUSATE SODIUM 100 MG
100 CAPSULE ORAL THREE TIMES A DAY
Qty: 0 | Refills: 0 | Status: DISCONTINUED | OUTPATIENT
Start: 2018-09-19 | End: 2018-09-20

## 2018-09-19 RX ADMIN — SIMETHICONE 80 MILLIGRAM(S): 80 TABLET, CHEWABLE ORAL at 23:03

## 2018-09-19 RX ADMIN — INFLUENZA VIRUS VACCINE 0.5 MILLILITER(S): 15; 15; 15; 15 SUSPENSION INTRAMUSCULAR at 17:19

## 2018-09-19 RX ADMIN — Medication 325 MILLIGRAM(S): at 17:19

## 2018-09-19 RX ADMIN — Medication 25 MILLIGRAM(S): at 02:49

## 2018-09-19 RX ADMIN — Medication 50 MICROGRAM(S): at 05:57

## 2018-09-19 RX ADMIN — HEPARIN SODIUM 10000 UNIT(S): 5000 INJECTION INTRAVENOUS; SUBCUTANEOUS at 17:19

## 2018-09-19 RX ADMIN — Medication 975 MILLIGRAM(S): at 11:00

## 2018-09-19 RX ADMIN — Medication 975 MILLIGRAM(S): at 10:12

## 2018-09-19 RX ADMIN — Medication 975 MILLIGRAM(S): at 02:49

## 2018-09-19 RX ADMIN — TETANUS TOXOID, REDUCED DIPHTHERIA TOXOID AND ACELLULAR PERTUSSIS VACCINE, ADSORBED 0.5 MILLILITER(S): 5; 2.5; 8; 8; 2.5 SUSPENSION INTRAMUSCULAR at 17:20

## 2018-09-19 RX ADMIN — SIMETHICONE 80 MILLIGRAM(S): 80 TABLET, CHEWABLE ORAL at 05:57

## 2018-09-19 RX ADMIN — Medication 975 MILLIGRAM(S): at 17:18

## 2018-09-19 RX ADMIN — Medication 325 MILLIGRAM(S): at 02:22

## 2018-09-19 RX ADMIN — Medication 975 MILLIGRAM(S): at 23:03

## 2018-09-19 RX ADMIN — Medication 975 MILLIGRAM(S): at 18:00

## 2018-09-19 RX ADMIN — Medication 100 MILLIGRAM(S): at 05:57

## 2018-09-19 RX ADMIN — Medication 975 MILLIGRAM(S): at 03:20

## 2018-09-19 RX ADMIN — Medication 500 MILLIGRAM(S): at 10:12

## 2018-09-19 RX ADMIN — Medication 325 MILLIGRAM(S): at 10:12

## 2018-09-19 RX ADMIN — Medication 975 MILLIGRAM(S): at 23:30

## 2018-09-19 RX ADMIN — Medication 100 MILLIGRAM(S): at 23:03

## 2018-09-19 RX ADMIN — HEPARIN SODIUM 10000 UNIT(S): 5000 INJECTION INTRAVENOUS; SUBCUTANEOUS at 05:57

## 2018-09-19 RX ADMIN — Medication 100 MILLIGRAM(S): at 17:19

## 2018-09-19 NOTE — PROGRESS NOTE ADULT - SUBJECTIVE AND OBJECTIVE BOX
Post Op C/S 2      Pt without complaints    Vital Signs Last 24 Hrs  T(C): 36.7 (19 Sep 2018 07:32), Max: 37.2 (19 Sep 2018 04:00)  T(F): 98.1 (19 Sep 2018 07:32), Max: 99 (19 Sep 2018 04:00)  HR: 76 (19 Sep 2018 07:32) (67 - 88)  BP: 107/46 (19 Sep 2018 07:32) (92/54 - 116/55)  BP(mean): --  RR: 16 (19 Sep 2018 07:32) (16 - 18)  SpO2: 97% (19 Sep 2018 07:32) (97% - 99%)  MEDICATIONS  (STANDING):  acetaminophen   Tablet .. 975 milliGRAM(s) Oral every 6 hours  ascorbic acid 500 milliGRAM(s) Oral daily  diphtheria/tetanus/pertussis (acellular) Vaccine (ADAcel) 0.5 milliLiter(s) IntraMuscular once  docusate sodium 100 milliGRAM(s) Oral three times a day  ferrous    sulfate 325 milliGRAM(s) Oral three times a day  heparin  Injectable 60469 Unit(s) SubCutaneous two times a day  influenza   Vaccine 0.5 milliLiter(s) IntraMuscular once  lactated ringers Bolus 500 milliLiter(s) IV Bolus once  lactated ringers Bolus 500 milliLiter(s) IV Bolus once  lactated ringers. 1000 milliLiter(s) (125 mL/Hr) IV Continuous <Continuous>  levothyroxine 50 MICROGram(s) Oral daily  oxyCODONE    IR 5 milliGRAM(s) Oral every 3 hours  prenatal multivitamin 1 Tablet(s) Oral daily    MEDICATIONS  (PRN):  diphenhydrAMINE   Capsule 25 milliGRAM(s) Oral every 6 hours PRN Itching  diphenhydrAMINE   Injectable 25 milliGRAM(s) IV Push every 4 hours PRN Pruritus  glycerin Suppository - Adult 1 Suppository(s) Rectal at bedtime PRN Constipation  HYDROmorphone  Injectable 1 milliGRAM(s) IV Push every 3 hours PRN Severe Pain (7 - 10)  lanolin Ointment 1 Application(s) Topical every 3 hours PRN Sore Nipples  naloxone Injectable 0.1 milliGRAM(s) IV Push every 3 minutes PRN For ANY of the following changes in patient status:  A. RR LESS THAN 10 breaths per minute, B. Oxygen saturation LESS THAN 90%, C. Sedation score of 6  ondansetron Injectable 4 milliGRAM(s) IV Push every 6 hours PRN Nausea  oxyCODONE    IR 5 milliGRAM(s) Oral every 3 hours PRN Mild Pain (1 - 3)  oxyCODONE    IR 10 milliGRAM(s) Oral every 3 hours PRN Moderate Pain (4 - 6)  oxyCODONE    IR 5 milliGRAM(s) Oral every 4 hours PRN Severe Pain (7 - 10)  simethicone 80 milliGRAM(s) Chew every 4 hours PRN Gas        Abdomen soft  fundus firm  Incision clean dry and intact  extremities non tender  lochia wnl                          6.4    12.21 )-----------( 201      ( 19 Sep 2018 00:00 )             21.2     Patient doing well    Routine post operative care

## 2018-09-19 NOTE — PROGRESS NOTE ADULT - ASSESSMENT
A/P: 39yo POD#2 s/p LTCS.  Peripartum course complicated by symptomatic anemia, receiving 2U pRBCs. vitals wnl. FAST negative overnight.
A/P: 39yo POD#1 s/p LTCS.  Patient is stable and doing well post-operatively.  CBC appropriate.

## 2018-09-19 NOTE — PROGRESS NOTE ADULT - SUBJECTIVE AND OBJECTIVE BOX
Patient evaluated due to  CBC w/ result of  6.4/21.2 at approximately 0115.  Patient reports feeling well, aside from mild dizziness when standing earlier this evening that quickly resolved.  Patient denies HA, lightheadness, dizziness, CP/SOB, n/v.  Patient is ambulating without difficulty . Patient is tolerating PO intake, and has a green catheter in place draining clear light-yellow urine, adequate in amount.    Vital Signs Last 24 Hrs  T(C): 36.8 (18 Sep 2018 21:59), Max: 37 (18 Sep 2018 18:22)  T(F): 98.2 (18 Sep 2018 21:59), Max: 98.6 (18 Sep 2018 18:22)  HR: 76 (18 Sep 2018 21:59) (67 - 93)  BP: 116/55 (18 Sep 2018 21:59) (92/54 - 119/48)  BP(mean): 63 (18 Sep 2018 02:45) (63 - 67)  RR: 18 (18 Sep 2018 21:59) (18 - 21)  SpO2: 98% (18 Sep 2018 21:59) (95% - 100%)                        6.4    12.21 )-----------( 201      ( 19 Sep 2018 00:00 )             21.2                     7.1   13.21 )-----------( 216      ( 18 Sep 2018 18:10 )                              22.8                 8.1    14.41 )-----------( 209      ( 18 Sep 2018 10:35 )             26.3                     8.6    17.5)----------( 220      ( 18 Sep 2018 06:00 )             28.1         6.4/21.2  7.1/22.8  8.1/26.3  8.6/28.1      Physical Exam:   General: sitting comfortably in bed, NAD, in good spirits, bottle-feeding baby  CV: RR S1S2   Lungs: breathing even, nonlabored, CTA throughout.    Abdomen: fundus firm, non-tender, non distended.     Incision c/d/i with no erythema, steri strips in place   Vaginal: scant vaginal blood on pad.  No active vaginal bleeding  Extermities: non tender b/l, no edema.      Assessment:   Patient is 38y       PPD/ POD#2 w/ routine CBC with result of 6.4/21.2.  Patient experiences dizziness with standing but otherwise feeling well.    No concern for current active bleeding.  Likely 2/2 acute blood loss anemia from delivery.      Plan:  - Transfuse 2 units of PRBC  - Continue Ferrous Sulfate, Colace, Vitamin C supplementation.  - Repeat CBC @ 4 hours post transfusion of both units  - Monitor for signs/symptoms of anemia.     D/w Dr. Nieves and Dr. Argueta who is covering for Dr. Bob Patient evaluated due to  CBC w/ result of  6.4/21.2 at approximately 0115.  Patient reports feeling well, aside from mild dizziness when standing earlier this evening that quickly resolved.  Patient denies HA, lightheadness, dizziness, CP/SOB, n/v.  Patient is ambulating without difficulty . Patient is tolerating PO intake, and has a green catheter in place draining clear light-yellow urine, adequate in amount.    Vital Signs Last 24 Hrs  T(C): 36.8 (18 Sep 2018 21:59), Max: 37 (18 Sep 2018 18:22)  T(F): 98.2 (18 Sep 2018 21:59), Max: 98.6 (18 Sep 2018 18:22)  HR: 76 (18 Sep 2018 21:59) (67 - 93)  BP: 116/55 (18 Sep 2018 21:59) (92/54 - 119/48)  BP(mean): 63 (18 Sep 2018 02:45) (63 - 67)  RR: 18 (18 Sep 2018 21:59) (18 - 21)  SpO2: 98% (18 Sep 2018 21:59) (95% - 100%)                        6.4    12.21 )-----------( 201      ( 19 Sep 2018 00:00 )             21.2                     7.1   13.21 )-----------( 216      ( 18 Sep 2018 18:10 )                              22.8                 8.1    14.41 )-----------( 209      ( 18 Sep 2018 10:35 )             26.3                     8.6    17.5)----------( 220      ( 18 Sep 2018 06:00 )             28.1         6.4/21.2  7.1/22.8  8.1/26.3  8.6/28.1      Physical Exam:   General: sitting comfortably in bed, NAD, in good spirits, bottle-feeding baby  CV: RR S1S2   Lungs: breathing even, nonlabored, CTA throughout.    Abdomen: fundus firm, non-tender, non distended.     Incision c/d/i with no erythema, steri strips in place   Vaginal: scant vaginal blood on pad.  No active vaginal bleeding  Extermities: non tender b/l, no edema.       Dr. Nieves and Manda PGY4 at bedside, FAST scan negative    Assessment:   Patient is 38y       PPD/ POD#2 w/ routine CBC with result of 6.4/21.2.  Patient experiences dizziness with standing but otherwise feeling well.    No concern for current active bleeding.  Likely 2/2 acute blood loss anemia from delivery.      Plan:  - Transfuse 2 units of PRBC  - Continue Ferrous Sulfate, Colace, Vitamin C supplementation.  - Repeat CBC @ 4 hours post transfusion of both units  - Monitor for signs/symptoms of anemia.     D/w Dr. Nieves and Dr. Argueta who is covering for Dr. Bob Patient evaluated due to  CBC w/ result of  6.4/21.2 at approximately 0115.  Patient reports feeling well, aside from mild dizziness when standing earlier this evening that quickly resolved.  Patient denies HA, lightheadness, dizziness, CP/SOB, n/v.  Patient is ambulating without difficulty . Patient is tolerating PO intake, and has a green catheter in place draining clear light-yellow urine, adequate in amount.    Vital Signs Last 24 Hrs  T(C): 36.8 (18 Sep 2018 21:59), Max: 37 (18 Sep 2018 18:22)  T(F): 98.2 (18 Sep 2018 21:59), Max: 98.6 (18 Sep 2018 18:22)  HR: 76 (18 Sep 2018 21:59) (67 - 93)  BP: 116/55 (18 Sep 2018 21:59) (92/54 - 119/48)  BP(mean): 63 (18 Sep 2018 02:45) (63 - 67)  RR: 18 (18 Sep 2018 21:59) (18 - 21)  SpO2: 98% (18 Sep 2018 21:59) (95% - 100%)                        6.4    12.21 )-----------( 201      ( 19 Sep 2018 00:00 )             21.2                     7.1   13.21 )-----------( 216      ( 18 Sep 2018 18:10 )                              22.8                 8.1    14.41 )-----------( 209      ( 18 Sep 2018 10:35 )             26.3                     8.6    17.5)----------( 220      ( 18 Sep 2018 06:00 )             28.1         6.4/21.2  7.1/22.8  8.1/26.3  8.6/28.1      Physical Exam:   General: sitting comfortably in bed, NAD, in good spirits, bottle-feeding baby  CV: RR S1S2   Lungs: breathing even, nonlabored, CTA throughout.    Abdomen: fundus firm, non-tender, non distended.     Incision c/d/i with no erythema, steri strips in place   Vaginal: scant vaginal blood on pad.  No active vaginal bleeding  Extermities: non tender b/l, no edema.       Dr. Nieves and Manda PGY4 at bedside, FAST scan negative    Assessment:   Patient is 38y     s/p repeat c/s   PPD/ POD#2 w/ routine CBC with result of 6.4/21.2.  Patient experiences dizziness with standing but otherwise feeling well.    No concern for current active bleeding.  Likely 2/2 acute blood loss anemia from delivery.      Plan:  - Transfuse 2 units of PRBC  - Continue Ferrous Sulfate, Colace, Vitamin C supplementation.  - Repeat CBC @ 4 hours post transfusion of both units  - Monitor for signs/symptoms of anemia.     D/w Dr. Nieves and Dr. Argueta who is covering for Dr. Bob

## 2018-09-19 NOTE — PROGRESS NOTE ADULT - PROBLEM SELECTOR PLAN 1
- Continue regular diet.  - Increase ambulation.  - Continue motrin, tylenol, oxycodone PRN for pain control.    - 4h post-transfusion CBC    Jeanne Wall MD PGY1  Pager #[phone]
- Continue regular diet.  - Increase ambulation.  - Continue motrin, tylenol, oxycodone PRN for pain control.      Liya Zavala PGY-1

## 2018-09-19 NOTE — CHART NOTE - NSCHARTNOTEFT_GEN_A_CORE
R4 Note    Patient seen and examined at bedside. Evaluated for dropping Hct. Patient admits to dizziness when she stands up from sitting. She denies any  CP/SOB, F/C/S. Pain is well controlled. Renteria in place.    Vital Signs Last 24 Hours  T(C): 36.8 (09-18-18 @ 21:59), Max: 37 (09-18-18 @ 18:22)  HR: 76 (09-18-18 @ 21:59) (67 - 93)  BP: 116/55 (09-18-18 @ 21:59) (92/54 - 119/48)  RR: 18 (09-18-18 @ 21:59) (18 - 21)  SpO2: 98% (09-18-18 @ 21:59) (95% - 100%)    Physical Exam:  General: NAD  Abdomen: Soft, appropriately tender, non-distended, fundus firm  Incision: Pfannenstiel incision CDI, subcuticular suture closure  Pelvic: Lochia wnl  Ext: NTBL    FAST scan negative.    Labs:    Blood Type: B Positive  Antibody Screen: Negative  RPR: Negative               6.4    12.21 )-----------( 201      ( 09-19 @ 00:00 )             21.2       MEDICATIONS  (STANDING):  acetaminophen   Tablet .. 975 milliGRAM(s) Oral every 6 hours  acetaminophen   Tablet .. 975 milliGRAM(s) Oral once  ascorbic acid 500 milliGRAM(s) Oral daily  diphenhydrAMINE   Capsule 25 milliGRAM(s) Oral once  diphtheria/tetanus/pertussis (acellular) Vaccine (ADAcel) 0.5 milliLiter(s) IntraMuscular once  docusate sodium 100 milliGRAM(s) Oral two times a day  ferrous    sulfate 325 milliGRAM(s) Oral three times a day  heparin  Injectable 01055 Unit(s) SubCutaneous two times a day  ibuprofen  Tablet. 600 milliGRAM(s) Oral every 6 hours  influenza   Vaccine 0.5 milliLiter(s) IntraMuscular once  ketorolac   Injectable 30 milliGRAM(s) IV Push every 6 hours  lactated ringers Bolus 500 milliLiter(s) IV Bolus once  lactated ringers Bolus 500 milliLiter(s) IV Bolus once  lactated ringers. 1000 milliLiter(s) (125 mL/Hr) IV Continuous <Continuous>  levothyroxine 50 MICROGram(s) Oral daily  oxyCODONE    IR 5 milliGRAM(s) Oral every 3 hours  prenatal multivitamin 1 Tablet(s) Oral daily    MEDICATIONS  (PRN):  diphenhydrAMINE   Capsule 25 milliGRAM(s) Oral every 6 hours PRN Itching  diphenhydrAMINE   Injectable 25 milliGRAM(s) IV Push every 4 hours PRN Pruritus  glycerin Suppository - Adult 1 Suppository(s) Rectal at bedtime PRN Constipation  HYDROmorphone  Injectable 1 milliGRAM(s) IV Push every 3 hours PRN Severe Pain (7 - 10)  lanolin Ointment 1 Application(s) Topical every 3 hours PRN Sore Nipples  naloxone Injectable 0.1 milliGRAM(s) IV Push every 3 minutes PRN For ANY of the following changes in patient status:  A. RR LESS THAN 10 breaths per minute, B. Oxygen saturation LESS THAN 90%, C. Sedation score of 6  ondansetron Injectable 4 milliGRAM(s) IV Push every 6 hours PRN Nausea  oxyCODONE    IR 5 milliGRAM(s) Oral every 3 hours PRN Mild Pain (1 - 3)  oxyCODONE    IR 10 milliGRAM(s) Oral every 3 hours PRN Moderate Pain (4 - 6)  oxyCODONE    IR 5 milliGRAM(s) Oral every 4 hours PRN Severe Pain (7 - 10)  simethicone 80 milliGRAM(s) Chew every 4 hours PRN Gas    Patient is s/p rLTCS with  with now dropping Hct and symptomatic anemia. Patient with appropriate drop in Hct as she started off with a low Hgb. FAST scan negative and vital signs stable.  - transfuse 2UPRBC   - Iron/vit c/colace    d/w Dr. Argueta  seen with Dr. Coburn-Mack  [facility] [id number] R4 Note    Patient seen and examined at bedside. Evaluated for dropping Hct. Patient admits to dizziness when she stands up from sitting. She denies any  CP/SOB, F/C/S. Pain is well controlled. Renteria in place.    Vital Signs Last 24 Hours  T(C): 36.8 (09-18-18 @ 21:59), Max: 37 (09-18-18 @ 18:22)  HR: 76 (09-18-18 @ 21:59) (67 - 93)  BP: 116/55 (09-18-18 @ 21:59) (92/54 - 119/48)  RR: 18 (09-18-18 @ 21:59) (18 - 21)  SpO2: 98% (09-18-18 @ 21:59) (95% - 100%)    Physical Exam:  General: NAD  Abdomen: Soft, appropriately tender, non-distended, fundus firm  Incision: Pfannenstiel incision CDI, subcuticular suture closure  Pelvic: Lochia wnl  Ext: NTBL    FAST scan negative.    Labs:    Blood Type: B Positive  Antibody Screen: Negative  RPR: Negative               6.4    12.21 )-----------( 201      ( 09-19 @ 00:00 )             21.2       MEDICATIONS  (STANDING):  acetaminophen   Tablet .. 975 milliGRAM(s) Oral every 6 hours  acetaminophen   Tablet .. 975 milliGRAM(s) Oral once  ascorbic acid 500 milliGRAM(s) Oral daily  diphenhydrAMINE   Capsule 25 milliGRAM(s) Oral once  diphtheria/tetanus/pertussis (acellular) Vaccine (ADAcel) 0.5 milliLiter(s) IntraMuscular once  docusate sodium 100 milliGRAM(s) Oral two times a day  ferrous    sulfate 325 milliGRAM(s) Oral three times a day  heparin  Injectable 97202 Unit(s) SubCutaneous two times a day  ibuprofen  Tablet. 600 milliGRAM(s) Oral every 6 hours  influenza   Vaccine 0.5 milliLiter(s) IntraMuscular once  ketorolac   Injectable 30 milliGRAM(s) IV Push every 6 hours  lactated ringers Bolus 500 milliLiter(s) IV Bolus once  lactated ringers Bolus 500 milliLiter(s) IV Bolus once  lactated ringers. 1000 milliLiter(s) (125 mL/Hr) IV Continuous <Continuous>  levothyroxine 50 MICROGram(s) Oral daily  oxyCODONE    IR 5 milliGRAM(s) Oral every 3 hours  prenatal multivitamin 1 Tablet(s) Oral daily    MEDICATIONS  (PRN):  diphenhydrAMINE   Capsule 25 milliGRAM(s) Oral every 6 hours PRN Itching  diphenhydrAMINE   Injectable 25 milliGRAM(s) IV Push every 4 hours PRN Pruritus  glycerin Suppository - Adult 1 Suppository(s) Rectal at bedtime PRN Constipation  HYDROmorphone  Injectable 1 milliGRAM(s) IV Push every 3 hours PRN Severe Pain (7 - 10)  lanolin Ointment 1 Application(s) Topical every 3 hours PRN Sore Nipples  naloxone Injectable 0.1 milliGRAM(s) IV Push every 3 minutes PRN For ANY of the following changes in patient status:  A. RR LESS THAN 10 breaths per minute, B. Oxygen saturation LESS THAN 90%, C. Sedation score of 6  ondansetron Injectable 4 milliGRAM(s) IV Push every 6 hours PRN Nausea  oxyCODONE    IR 5 milliGRAM(s) Oral every 3 hours PRN Mild Pain (1 - 3)  oxyCODONE    IR 10 milliGRAM(s) Oral every 3 hours PRN Moderate Pain (4 - 6)  oxyCODONE    IR 5 milliGRAM(s) Oral every 4 hours PRN Severe Pain (7 - 10)  simethicone 80 milliGRAM(s) Chew every 4 hours PRN Gas    Patient is s/p rLTCS with  with now dropping Hct and symptomatic anemia. Patient with appropriate drop in Hct as she started off with a low Hgb. FAST scan negative and vital signs stable.  - transfuse 2UPRBC   - Iron/vit c/colace    d/w Dr. Argueta  seen with Dr. Nieves  [id number]    Attending Note   Pt was seen and examined  Reports feeling dizzy when standing up   FAST scan neg, personally present   abd soft, nontender  will give 2 u PRBCS for symptomatic anemia

## 2018-09-19 NOTE — PROGRESS NOTE ADULT - SUBJECTIVE AND OBJECTIVE BOX
Postpartum Note,  Section     38y year old woman post-operative day 2 from [facility].  No acute events overnight.  Patient has no complaints and pain is well-controlled.  Patient is tolerating regular diet, passing flatus, ambulating, and is voiding spontaneously.  Postpartum bleeding is well controlled. Patient denies lightheadedness since episode overnight. No shortness of breath, chest pain, and palpitations.      Physical exam:    Vital Signs Last 24 Hrs  T(C): 36.4 (19 Sep 2018 09:28), Max: 37.2 (19 Sep 2018 04:00)  T(F): 97.5 (19 Sep 2018 09:28), Max: 99 (19 Sep 2018 04:00)  HR: 83 (19 Sep 2018 09:) (67 - 88)  BP: 107/46 (19 Sep 2018 09:28) (92/54 - 116/55)  BP(mean): --  RR: 16 (19 Sep 2018 09:28) (16 - 18)  SpO2: 98% (19 Sep 2018 09:28) (97% - 99%)    Gen: NAD  Abdomen: Soft, nontender, no distension , firm uterine fundus at umbilicus.  Incision: Clean, dry, and intact  Pelvic: Normal lochia noted  Ext: No calf tenderness    LABS:                        6.4    12.21 )-----------( 201      ( 19 Sep 2018 00:00 )             21.2                         7.1    13.21 )-----------( 216      ( 18 Sep 2018 18:10 )             22.8                         8.1    14.41 )-----------( 209      ( 18 Sep 2018 10:35 )             26.3                         8.6    17.05 )-----------( 220      ( 18 Sep 2018 06:00 )             28.1           acetaminophen   Tablet .. 975 milliGRAM(s) Oral every 6 hours  ascorbic acid 500 milliGRAM(s) Oral daily  diphenhydrAMINE   Capsule 25 milliGRAM(s) Oral every 6 hours PRN Itching  diphenhydrAMINE   Injectable 25 milliGRAM(s) IV Push every 4 hours PRN Pruritus  diphtheria/tetanus/pertussis (acellular) Vaccine (ADAcel) 0.5 milliLiter(s) IntraMuscular once  docusate sodium 100 milliGRAM(s) Oral three times a day  ferrous    sulfate 325 milliGRAM(s) Oral three times a day  glycerin Suppository - Adult 1 Suppository(s) Rectal at bedtime PRN Constipation  heparin  Injectable 05837 Unit(s) SubCutaneous two times a day  HYDROmorphone  Injectable 1 milliGRAM(s) IV Push every 3 hours PRN Severe Pain (7 - 10)  influenza   Vaccine 0.5 milliLiter(s) IntraMuscular once  lactated ringers Bolus 500 milliLiter(s) IV Bolus once  lactated ringers Bolus 500 milliLiter(s) IV Bolus once  lactated ringers. 1000 milliLiter(s) IV Continuous <Continuous>  lanolin Ointment 1 Application(s) Topical every 3 hours PRN Sore Nipples  levothyroxine 50 MICROGram(s) Oral daily  naloxone Injectable 0.1 milliGRAM(s) IV Push every 3 minutes PRN For ANY of the following changes in patient status:  A. RR LESS THAN 10 breaths per minute, B. Oxygen saturation LESS THAN 90%, C. Sedation score of 6  ondansetron Injectable 4 milliGRAM(s) IV Push every 6 hours PRN Nausea  oxyCODONE    IR 5 milliGRAM(s) Oral every 3 hours PRN Mild Pain (1 - 3)  oxyCODONE    IR 10 milliGRAM(s) Oral every 3 hours PRN Moderate Pain (4 - 6)  oxyCODONE    IR 5 milliGRAM(s) Oral every 3 hours  oxyCODONE    IR 5 milliGRAM(s) Oral every 4 hours PRN Severe Pain (7 - 10)  prenatal multivitamin 1 Tablet(s) Oral daily  simethicone 80 milliGRAM(s) Chew every 4 hours PRN Gas

## 2018-09-20 VITALS
TEMPERATURE: 98 F | RESPIRATION RATE: 16 BRPM | SYSTOLIC BLOOD PRESSURE: 119 MMHG | OXYGEN SATURATION: 98 % | HEART RATE: 75 BPM | DIASTOLIC BLOOD PRESSURE: 56 MMHG

## 2018-09-20 RX ORDER — LEVOTHYROXINE SODIUM 125 MCG
1 TABLET ORAL
Qty: 0 | Refills: 0 | COMMUNITY
Start: 2018-09-20

## 2018-09-20 RX ORDER — ACETAMINOPHEN 500 MG
3 TABLET ORAL
Qty: 0 | Refills: 0 | COMMUNITY
Start: 2018-09-20

## 2018-09-20 RX ADMIN — Medication 325 MILLIGRAM(S): at 02:52

## 2018-09-20 RX ADMIN — SIMETHICONE 80 MILLIGRAM(S): 80 TABLET, CHEWABLE ORAL at 05:32

## 2018-09-20 RX ADMIN — Medication 100 MILLIGRAM(S): at 05:32

## 2018-09-20 RX ADMIN — Medication 50 MICROGRAM(S): at 05:32

## 2018-09-20 RX ADMIN — Medication 975 MILLIGRAM(S): at 06:00

## 2018-09-20 RX ADMIN — HEPARIN SODIUM 10000 UNIT(S): 5000 INJECTION INTRAVENOUS; SUBCUTANEOUS at 05:32

## 2018-09-20 RX ADMIN — Medication 975 MILLIGRAM(S): at 05:33

## 2018-09-20 NOTE — PROGRESS NOTE ADULT - SUBJECTIVE AND OBJECTIVE BOX
SUBJECTIVE:    Pain: Controlled    Complaints: None    MILESTONES:    Alert and Oriented x 3  [ x ]  Out of bed/ ambulating. [ x ]  Flatus:   Positive [ x ]  Negative [  ]  Bowel movement  [  ] Positive [  ] Negative   Voiding [x  ] Due to void [  ]   Renteria/Indwelling catheter in place [  ]  Diet: Regular [ x ]  Clears [  ]  NPO [  ]    Infant feeding:  Breast [  ]   Bottle [X  ]  Both [  ]  Feeding related issues and/or concerns:      OBJECTIVE:  T(C): 36.8 (18 @ 05:35), Max: 37.1 (18 @ 10:17)  HR: 75 (18 @ 05:35) (72 - 84)  BP: 119/56 (18 @ 05:35) (103/44 - 131/53)  RR: 16 (18 @ 05:35) (16 - 18)  SpO2: 98% (18 @ 05:35) (98% - 100%)  Wt(kg): --                        8.6    14.30 )-----------( 228      ( 19 Sep 2018 16:25 )             27.9           Blood Type: B Positive    RPR: Negative          MEDICATIONS  (STANDING):  acetaminophen   Tablet .. 975 milliGRAM(s) Oral every 6 hours  ascorbic acid 500 milliGRAM(s) Oral daily  docusate sodium 100 milliGRAM(s) Oral three times a day  ferrous    sulfate 325 milliGRAM(s) Oral three times a day  heparin  Injectable 88793 Unit(s) SubCutaneous two times a day  lactated ringers Bolus 500 milliLiter(s) IV Bolus once  lactated ringers Bolus 500 milliLiter(s) IV Bolus once  levothyroxine 50 MICROGram(s) Oral daily  oxyCODONE    IR 5 milliGRAM(s) Oral every 3 hours  prenatal multivitamin 1 Tablet(s) Oral daily    MEDICATIONS  (PRN):  diphenhydrAMINE   Capsule 25 milliGRAM(s) Oral every 6 hours PRN Itching  glycerin Suppository - Adult 1 Suppository(s) Rectal at bedtime PRN Constipation  lanolin Ointment 1 Application(s) Topical every 3 hours PRN Sore Nipples  oxyCODONE    IR 5 milliGRAM(s) Oral every 4 hours PRN Severe Pain (7 - 10)  simethicone 80 milliGRAM(s) Chew every 4 hours PRN Gas        ASSESSMENT:    38y     G  4    P  4004       PO Day#  3        Delivery: Primary [  ]    Repeat [ X ]       H & H - 6.4/21.2, s/p 2 Units PRBC's,   FAST - Neg ()                                  Indication of procedure: Previous C/S in Labor    Condition: Stable    Past Medical History significant for: HPI: Hx. GDM - I, Hypothyroidism, Exploratory Lap      Current Issues:    Breasts:  Soft [x  ]   Engorged [  ]  Nipples:  Abdomen: Soft [ x ]   Distended [  ] Nontender [  ]     Bowel sounds :  Present [  ]  Absent [  ]   Fundus:  Firm [x  ]  Boggy [  ]    Abdominal incision: Clean, Dry and Intact [x  ]  Staples [  ] Steri Strips [ X ] Dermabond [  ] Sutures [  ]    Patient wearing abdominal binder for support.    Vaginal: Lochia:  Heavy [  ]  Moderate [ x ]   Scant [  ]    Extremities: Edema [X  ] Negative Michelle's Sign [ X ] Nontender Nacho  [ x ] Positive pedal pulses [  ]    Other relevant physical exam findings:      PLAN:    Plan: Increase ambulation, analgesia PRN and pain medication protocol standing oxycodone, ibuprofen and acetaminophen.    Diet: Regular diet    Continue routine post-operative and postpartum care.  For repeat Glucose Testing in 6 weeks.    Discharge Planning [ x ]    For discharge Today  [ X   ]    Consults:  Social Work [  ]  Lactation [ x ]  Other [         ]

## 2018-12-06 ENCOUNTER — APPOINTMENT (OUTPATIENT)
Dept: MATERNAL FETAL MEDICINE | Facility: CLINIC | Age: 38
End: 2018-12-06
Payer: COMMERCIAL

## 2018-12-06 PROCEDURE — G0109 DIAB MANAGE TRN IND/GROUP: CPT

## 2018-12-07 ENCOUNTER — OTHER (OUTPATIENT)
Age: 38
End: 2018-12-07

## 2018-12-10 LAB
GLUCOSE 2H P 100 G GLC PO SERPL-MCNC: 103 MG/DL
GLUCOSE BS SERPL-MCNC: 105 MG/DL

## 2019-01-22 ENCOUNTER — TRANSCRIPTION ENCOUNTER (OUTPATIENT)
Age: 39
End: 2019-01-22

## 2019-09-14 ENCOUNTER — TRANSCRIPTION ENCOUNTER (OUTPATIENT)
Age: 39
End: 2019-09-14

## 2020-08-13 ENCOUNTER — APPOINTMENT (OUTPATIENT)
Dept: ENDOCRINOLOGY | Facility: CLINIC | Age: 40
End: 2020-08-13
Payer: COMMERCIAL

## 2020-08-13 VITALS
WEIGHT: 202.13 LBS | BODY MASS INDEX: 42.43 KG/M2 | SYSTOLIC BLOOD PRESSURE: 122 MMHG | OXYGEN SATURATION: 99 % | DIASTOLIC BLOOD PRESSURE: 67 MMHG | HEIGHT: 58 IN | HEART RATE: 74 BPM

## 2020-08-13 DIAGNOSIS — Z80.0 FAMILY HISTORY OF MALIGNANT NEOPLASM OF DIGESTIVE ORGANS: ICD-10-CM

## 2020-08-13 DIAGNOSIS — O24.419 GESTATIONAL DIABETES MELLITUS IN PREGNANCY, UNSPECIFIED CONTROL: ICD-10-CM

## 2020-08-13 DIAGNOSIS — Z82.49 FAMILY HISTORY OF ISCHEMIC HEART DISEASE AND OTHER DISEASES OF THE CIRCULATORY SYSTEM: ICD-10-CM

## 2020-08-13 DIAGNOSIS — G47.30 SLEEP APNEA, UNSPECIFIED: ICD-10-CM

## 2020-08-13 DIAGNOSIS — S49.90XA UNSPECIFIED INJURY OF SHOULDER AND UPPER ARM, UNSPECIFIED ARM, INITIAL ENCOUNTER: ICD-10-CM

## 2020-08-13 DIAGNOSIS — Z80.3 FAMILY HISTORY OF MALIGNANT NEOPLASM OF BREAST: ICD-10-CM

## 2020-08-13 DIAGNOSIS — Z83.49 FAMILY HISTORY OF OTHER ENDOCRINE, NUTRITIONAL AND METABOLIC DISEASES: ICD-10-CM

## 2020-08-13 DIAGNOSIS — O24.414 GESTATIONAL DIABETES MELLITUS IN PREGNANCY, INSULIN CONTROLLED: ICD-10-CM

## 2020-08-13 DIAGNOSIS — Z82.69 FAMILY HISTORY OF OTHER DISEASES OF THE MUSCULOSKELETAL SYSTEM AND CONNECTIVE TISSUE: ICD-10-CM

## 2020-08-13 DIAGNOSIS — G47.26 CIRCADIAN RHYTHM SLEEP DISORDER, SHIFT WORK TYPE: ICD-10-CM

## 2020-08-13 DIAGNOSIS — Z86.32 PERSONAL HISTORY OF GESTATIONAL DIABETES: ICD-10-CM

## 2020-08-13 DIAGNOSIS — Z82.3 FAMILY HISTORY OF STROKE: ICD-10-CM

## 2020-08-13 LAB — HBA1C MFR BLD HPLC: 6

## 2020-08-13 PROCEDURE — 99205 OFFICE O/P NEW HI 60 MIN: CPT | Mod: 25

## 2020-08-13 PROCEDURE — G0447 BEHAVIOR COUNSEL OBESITY 15M: CPT

## 2020-08-13 PROCEDURE — 83036 HEMOGLOBIN GLYCOSYLATED A1C: CPT | Mod: QW

## 2020-08-13 RX ORDER — 70%ISOPROPYL ALCOHOL 0.7 ML/ML
70 SWAB TOPICAL
Qty: 1 | Refills: 3 | Status: DISCONTINUED | COMMUNITY
Start: 2018-08-01 | End: 2020-08-13

## 2020-08-13 RX ORDER — INSULIN HUMAN 100 [IU]/ML
100 INJECTION, SUSPENSION SUBCUTANEOUS
Qty: 1 | Refills: 0 | Status: DISCONTINUED | COMMUNITY
Start: 2018-08-01 | End: 2020-08-13

## 2020-08-13 RX ORDER — URINE ACETONE TEST STRIPS
STRIP MISCELLANEOUS
Qty: 1 | Refills: 0 | Status: DISCONTINUED | COMMUNITY
Start: 2018-07-31 | End: 2020-08-13

## 2020-08-13 RX ORDER — BLOOD SUGAR DIAGNOSTIC
STRIP MISCELLANEOUS
Qty: 2 | Refills: 1 | Status: DISCONTINUED | COMMUNITY
Start: 2018-07-31 | End: 2020-08-13

## 2020-08-13 RX ORDER — ARMODAFINIL 200 MG/1
TABLET ORAL
Refills: 0 | Status: ACTIVE | COMMUNITY

## 2020-08-13 RX ORDER — LANCETS 30 GAUGE
EACH MISCELLANEOUS
Qty: 2 | Refills: 1 | Status: DISCONTINUED | COMMUNITY
Start: 2018-07-31 | End: 2020-08-13

## 2020-08-13 RX ORDER — PEN NEEDLE, DIABETIC 29 G X1/2"
32G X 4 MM NEEDLE, DISPOSABLE MISCELLANEOUS
Qty: 1 | Refills: 3 | Status: DISCONTINUED | COMMUNITY
Start: 2018-08-01 | End: 2020-08-13

## 2020-08-13 NOTE — CONSULT LETTER
[Consult Letter:] : I had the pleasure of evaluating your patient, [unfilled]. [Dear  ___] : Dear  [unfilled], [Consult Closing:] : Thank you very much for allowing me to participate in the care of this patient.  If you have any questions, please do not hesitate to contact me. [Please see my note below.] : Please see my note below. [Sincerely,] : Sincerely, [FreeTextEntry3] : Yamini ALVAREZC

## 2020-08-13 NOTE — PHYSICAL EXAM
[Alert] : alert [Well Nourished] : well nourished [No Acute Distress] : no acute distress [Well Developed] : well developed [Normal Sclera/Conjunctiva] : normal sclera/conjunctiva [No Proptosis] : no proptosis [EOMI] : extra ocular movement intact [Normal Oropharynx] : the oropharynx was normal [Thyroid Not Enlarged] : the thyroid was not enlarged [No Thyroid Nodules] : no palpable thyroid nodules [No Respiratory Distress] : no respiratory distress [No Accessory Muscle Use] : no accessory muscle use [Normal Rate] : heart rate was normal [Regular Rhythm] : with a regular rhythm [Normal S1, S2] : normal S1 and S2 [Clear to Auscultation] : lungs were clear to auscultation bilaterally [Pedal Pulses Normal] : the pedal pulses are present [No Edema] : no peripheral edema [Not Tender] : non-tender [Normal Bowel Sounds] : normal bowel sounds [Not Distended] : not distended [Soft] : abdomen soft [Normal Anterior Cervical Nodes] : no anterior cervical lymphadenopathy [Normal Posterior Cervical Nodes] : no posterior cervical lymphadenopathy [No Spinal Tenderness] : no spinal tenderness [Normal Gait] : normal gait [Spine Straight] : spine straight [Normal Strength/Tone] : muscle strength and tone were normal [No Rash] : no rash [Hirsutism] : hirsutism present [Normal] : normal [Full ROM] : with full range of motion [No Tremors] : no tremors [Normal Reflexes] : deep tendon reflexes were 2+ and symmetric [Oriented x3] : oriented to person, place, and time [Acanthosis Nigricans] : no acanthosis nigricans [Diminished Throughout Both Feet] : normal tactile sensation with monofilament testing throughout both feet

## 2020-08-13 NOTE — HISTORY OF PRESENT ILLNESS
[FreeTextEntry1] : 39 year old female presents for evaluation of hypothyroidism and obesity. She reports her thyroid function has always fluctuated between hypo and hyper. She does not recall when but she was started on Levothyroxine. She has been on Synthroid 50 mcg daily for several years but does recall being switch between brand and generic. She also recalls having nodules and going for biopsies. She also thinks she had an uptake scan. She takes her Synthroid correctly and reports daily compliance. She has not had a proper thyroid evaluation for several years.\par She also presents c/o about her weight. She feels she has been unable to lose any weight. She states she has made changes to her diet and is very active but can't lose even 1 lb. She does have a h/o gestational diabetes and was on insulin. She was tested after delivery and everything was normal. 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 She also has sleep apnea.\par She has not had labs in over 1 year.\par She is planning to go for right shoulder arthroscopy next month.

## 2020-08-13 NOTE — REASON FOR VISIT
[Initial Evaluation] : an initial evaluation [DM Type 2] : DM Type 2 [Hypothyroidism] : hypothyroidism [Weight Management/Obesity] : weight management/obesity [FreeTextEntry2] : Dr. Nelson

## 2020-08-13 NOTE — PAST MEDICAL HISTORY
[Regular Cycle Intervals] : have been regular [Menarche Age ____] : age at menarche was [unfilled] [Menstruating] : The patient is menstruating [Live Births ___] : P[unfilled]  [Total Preg ___] : G[unfilled]

## 2020-08-13 NOTE — REVIEW OF SYSTEMS
[Fatigue] : fatigue [Joint Pain] : joint pain [Recent Weight Gain (___ Lbs)] : recent weight gain: [unfilled] lbs [Joint Stiffness] : joint stiffness [Muscle Cramps] : muscle cramps [Hirsutism] : hirsutism [Hair Loss] : hair loss [Tremors] : tremors [Negative] : Heme/Lymph [All other systems negative] : All other systems negative

## 2020-08-13 NOTE — ASSESSMENT
[Diabetes Foot Care] : diabetes foot care [Long Term Vascular Complications] : long term vascular complications of diabetes [Importance of Diet and Exercise] : importance of diet and exercise to improve glycemic control, achieve weight loss and improve cardiovascular health [Carbohydrate Consistent Diet] : carbohydrate consistent diet [Self Monitoring of Blood Glucose] : self monitoring of blood glucose [Exercise/Effect on Glucose] : exercise/effect on glucose [Retinopathy Screening] : Patient was referred to ophthalmology for retinopathy screening [Levothyroxine] : The patient was instructed to take Levothyroxine on an empty stomach, separate from vitamins, and wait at least 30 minutes before eating [Weight Loss] : weight loss [FreeTextEntry1] : 1. Hypothyroidism/Thyroid nodule - She will continue Synthroid 50 mcg daily. She will go for labs to check TFTs and Thyroid Ab. She will also go for a thyroid sono.\par 2. IGT/Obesity - She was unaware that she has IGT. We discussed the importance of the good diabetic control. We discussed factors that can impact this. We also discussed the consequences of uncontrolled BS. She will focus on weight loss. Pending labs will consider medication to supplement her efforts.  We discussed at length the importance of following a carbohydrate balanced diet and the importance of incorporating protein in meals. We also discussed appropriate alternative food choices. I have recommended she f/u with the RD for additional education. We discussed ways she can incorporate exercise into her daily routine. I recommended she read the Obesity COde by Dr. Fowler. We discussed the importance of weight loss in the management of her comorbidities. We discussed the risks of continued excess weight on long term health. Will also c/o OVernight DST to r/o Cushings. We also discussed that she would like to try to lose weight on her own and does not want bariatric surgery.\par \par f/u in 2-3 weeks

## 2020-08-18 ENCOUNTER — APPOINTMENT (OUTPATIENT)
Dept: ENDOCRINOLOGY | Facility: CLINIC | Age: 40
End: 2020-08-18

## 2020-08-24 LAB
25(OH)D3 SERPL-MCNC: 18.3 NG/ML
ALBUMIN SERPL ELPH-MCNC: 4.8 G/DL
ALP BLD-CCNC: 62 U/L
ALT SERPL-CCNC: 15 U/L
ANION GAP SERPL CALC-SCNC: 14 MMOL/L
APPEARANCE: CLEAR
AST SERPL-CCNC: 15 U/L
BASOPHILS # BLD AUTO: 0.05 K/UL
BASOPHILS NFR BLD AUTO: 0.4 %
BILIRUB SERPL-MCNC: 0.2 MG/DL
BILIRUBIN URINE: NEGATIVE
BLOOD URINE: NEGATIVE
BUN SERPL-MCNC: 11 MG/DL
CALCIUM SERPL-MCNC: 9.8 MG/DL
CHLORIDE SERPL-SCNC: 101 MMOL/L
CHOLEST SERPL-MCNC: 197 MG/DL
CHOLEST/HDLC SERPL: 4.8 RATIO
CO2 SERPL-SCNC: 24 MMOL/L
COLOR: NORMAL
CORTIS SERPL-MCNC: 1.1 UG/DL
CREAT SERPL-MCNC: 0.65 MG/DL
CREAT SPEC-SCNC: 165 MG/DL
EOSINOPHIL # BLD AUTO: 0.12 K/UL
EOSINOPHIL NFR BLD AUTO: 0.9 %
FOLATE SERPL-MCNC: 13.4 NG/ML
GLUCOSE QUALITATIVE U: NEGATIVE
GLUCOSE SERPL-MCNC: 105 MG/DL
HCT VFR BLD CALC: 41.4 %
HDLC SERPL-MCNC: 41 MG/DL
HGB BLD-MCNC: 12.1 G/DL
IMM GRANULOCYTES NFR BLD AUTO: 0.3 %
INSULIN SERPL-MCNC: 22.2 UU/ML
KETONES URINE: NEGATIVE
LDLC SERPL CALC-MCNC: 96 MG/DL
LEUKOCYTE ESTERASE URINE: NEGATIVE
LYMPHOCYTES # BLD AUTO: 3.7 K/UL
LYMPHOCYTES NFR BLD AUTO: 26.9 %
MAN DIFF?: NORMAL
MCHC RBC-ENTMCNC: 23 PG
MCHC RBC-ENTMCNC: 29.2 GM/DL
MCV RBC AUTO: 78.7 FL
MICROALBUMIN 24H UR DL<=1MG/L-MCNC: <1.2 MG/DL
MICROALBUMIN/CREAT 24H UR-RTO: NORMAL MG/G
MONOCYTES # BLD AUTO: 0.86 K/UL
MONOCYTES NFR BLD AUTO: 6.2 %
NEUTROPHILS # BLD AUTO: 9 K/UL
NEUTROPHILS NFR BLD AUTO: 65.3 %
NITRITE URINE: NEGATIVE
PH URINE: 8
PLATELET # BLD AUTO: 374 K/UL
POTASSIUM SERPL-SCNC: 4.2 MMOL/L
PROT SERPL-MCNC: 8 G/DL
PROTEIN URINE: NORMAL
RBC # BLD: 5.26 M/UL
RBC # FLD: 15.8 %
SAVE SPECIMEN: NORMAL
SAVE SPECIMEN: NORMAL
SODIUM SERPL-SCNC: 139 MMOL/L
SPECIFIC GRAVITY URINE: 1.03
T4 FREE SERPL-MCNC: 2 NG/DL
T4 SERPL-MCNC: 15.3 UG/DL
THYROGLOB AB SERPL-ACNC: 175 IU/ML
THYROPEROXIDASE AB SERPL IA-ACNC: 399 IU/ML
TRIGL SERPL-MCNC: 299 MG/DL
TSH SERPL-ACNC: 0.02 UIU/ML
UROBILINOGEN URINE: NORMAL
VIT B12 SERPL-MCNC: 274 PG/ML
WBC # FLD AUTO: 13.77 K/UL

## 2020-08-31 ENCOUNTER — NON-APPOINTMENT (OUTPATIENT)
Age: 40
End: 2020-08-31

## 2020-08-31 RX ORDER — LEVOTHYROXINE SODIUM 50 UG/1
50 TABLET ORAL
Refills: 0 | Status: DISCONTINUED | COMMUNITY
End: 2020-08-31

## 2020-09-30 ENCOUNTER — APPOINTMENT (OUTPATIENT)
Dept: ENDOCRINOLOGY | Facility: CLINIC | Age: 40
End: 2020-09-30
Payer: COMMERCIAL

## 2020-09-30 VITALS
HEIGHT: 58 IN | HEART RATE: 79 BPM | SYSTOLIC BLOOD PRESSURE: 128 MMHG | BODY MASS INDEX: 42.06 KG/M2 | WEIGHT: 200.38 LBS | OXYGEN SATURATION: 97 % | DIASTOLIC BLOOD PRESSURE: 76 MMHG

## 2020-09-30 LAB — GLUCOSE BLDC GLUCOMTR-MCNC: 153

## 2020-09-30 PROCEDURE — 99214 OFFICE O/P EST MOD 30 MIN: CPT | Mod: 25

## 2020-09-30 PROCEDURE — G0447 BEHAVIOR COUNSEL OBESITY 15M: CPT

## 2020-09-30 PROCEDURE — 82962 GLUCOSE BLOOD TEST: CPT

## 2020-09-30 NOTE — PHYSICAL EXAM
[Alert] : alert [Well Nourished] : well nourished [No Acute Distress] : no acute distress [Well Developed] : well developed [Normal Sclera/Conjunctiva] : normal sclera/conjunctiva [EOMI] : extra ocular movement intact [No Proptosis] : no proptosis [Normal Oropharynx] : the oropharynx was normal [Thyroid Not Enlarged] : the thyroid was not enlarged [No Thyroid Nodules] : no palpable thyroid nodules [No Respiratory Distress] : no respiratory distress [No Accessory Muscle Use] : no accessory muscle use [Clear to Auscultation] : lungs were clear to auscultation bilaterally [Normal S1, S2] : normal S1 and S2 [Normal Rate] : heart rate was normal [Regular Rhythm] : with a regular rhythm [No Edema] : no peripheral edema [Pedal Pulses Normal] : the pedal pulses are present [Normal Bowel Sounds] : normal bowel sounds [Not Tender] : non-tender [Not Distended] : not distended [Soft] : abdomen soft [Normal Anterior Cervical Nodes] : no anterior cervical lymphadenopathy [No Spinal Tenderness] : no spinal tenderness [Spine Straight] : spine straight [Normal Gait] : normal gait [Normal Strength/Tone] : muscle strength and tone were normal [No Rash] : no rash [Hirsutism] : hirsutism present [Normal] : normal [Full ROM] : with full range of motion [Normal Reflexes] : deep tendon reflexes were 2+ and symmetric [No Tremors] : no tremors [Oriented x3] : oriented to person, place, and time [Acanthosis Nigricans] : no acanthosis nigricans [Diminished Throughout Both Feet] : normal tactile sensation with monofilament testing throughout both feet

## 2020-09-30 NOTE — HISTORY OF PRESENT ILLNESS
[FreeTextEntry1] : Patient presents for follow up of hypothyroidism, IGT and obesity. Presently she is on Levothyroxine 25 mcg, which was recently lowered due to suppressed TSH. She went for a thyroid sono, which showed multiple thyroid nodules. The largest on the right side which is 1.8 cm. She did have a previous thyroid sono which also showed a 1.8 cm nodule, which was biopsied. The sono also showed a 1 cm exophytic nodule for which additional workup is needed. She takes her medication correctly and reports daily compliance. \par She continues to struggle with her weight. She states despite following a healthy diet and exercising she is unable to lose weight. \par She does have a h/o gestational diabetes and was on insulin. Recent A1C is 6. Recent labs show insulin resistance. 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 She also has sleep apnea.\par We reviewed her recent labs. Her Vit D and B12 are low. She has been taking supplements since. Overnight DST was normal but on the higher side. She did read the Obesity Code.\par She had recent right shoulder arthroscopy.

## 2020-09-30 NOTE — ASSESSMENT
[FreeTextEntry1] : 1. Hypothyroidism/Thyroid nodule - She will continue Levothyroxine 25 mcg daily. Will request previous FNA results from prior Endocrinologist. Labs done today to check PTH/Calcium due to the exophytic nodule. Will consider FNA pending labs. \par 2. IGT/Obesity - We did discuss the implications of the insulin resistance. She will start Metformin 500 mg daily and titrate to 4 tabs daily if she can tolerate. After 1 month, she will start Ozempic 0.25 mg weekly for 2 weeks and then increase to 0.5 mg weekly if tolerating. She will try to follow a lower carb diet. I have recommended she f/u with the RD for additional education. She will f/u in 2 months. \par We discussed the importance of weight loss in the management of her comorbidities. We discussed the risks of continued excess weight on long term health. \par  [Diabetes Foot Care] : diabetes foot care [Long Term Vascular Complications] : long term vascular complications of diabetes [Carbohydrate Consistent Diet] : carbohydrate consistent diet [Importance of Diet and Exercise] : importance of diet and exercise to improve glycemic control, achieve weight loss and improve cardiovascular health [Exercise/Effect on Glucose] : exercise/effect on glucose [Self Monitoring of Blood Glucose] : self monitoring of blood glucose [Injection Technique, Storage, Sharps Disposal] : injection technique, storage, and sharps disposal [Retinopathy Screening] : Patient was referred to ophthalmology for retinopathy screening [Weight Loss] : weight loss [Diabetic Medications] : Risks and benefits of diabetic medications were discussed

## 2020-09-30 NOTE — REASON FOR VISIT
[Follow - Up] : a follow-up visit [Hypothyroidism] : hypothyroidism [Thyroid nodule/ MNG] : thyroid nodule/ MNG [Weight Management/Obesity] : weight management/obesity [Other___] : [unfilled]

## 2020-10-05 LAB
24R-OH-CALCIDIOL SERPL-MCNC: 111 PG/ML
25(OH)D3 SERPL-MCNC: 19.5 NG/ML
ALBUMIN SERPL ELPH-MCNC: 4.4 G/DL
ALP BLD-CCNC: 61 U/L
ALT SERPL-CCNC: 13 U/L
ANION GAP SERPL CALC-SCNC: 13 MMOL/L
AST SERPL-CCNC: 19 U/L
BASOPHILS # BLD AUTO: 0.04 K/UL
BASOPHILS NFR BLD AUTO: 0.3 %
BILIRUB SERPL-MCNC: 0.2 MG/DL
BUN SERPL-MCNC: 6 MG/DL
CA-I SERPL-SCNC: 1.21 MMOL/L
CALCIUM SERPL-MCNC: 9.5 MG/DL
CALCIUM SERPL-MCNC: 9.5 MG/DL
CHLORIDE SERPL-SCNC: 104 MMOL/L
CO2 SERPL-SCNC: 24 MMOL/L
CREAT SERPL-MCNC: 0.71 MG/DL
EOSINOPHIL # BLD AUTO: 0.15 K/UL
EOSINOPHIL NFR BLD AUTO: 1.2 %
FOLATE SERPL-MCNC: 9.2 NG/ML
GLUCOSE SERPL-MCNC: 106 MG/DL
HCT VFR BLD CALC: 36.7 %
HGB BLD-MCNC: 10.6 G/DL
IMM GRANULOCYTES NFR BLD AUTO: 0.5 %
LYMPHOCYTES # BLD AUTO: 4.02 K/UL
LYMPHOCYTES NFR BLD AUTO: 31.7 %
MAGNESIUM SERPL-MCNC: 1.9 MG/DL
MAN DIFF?: NORMAL
MCHC RBC-ENTMCNC: 23.1 PG
MCHC RBC-ENTMCNC: 28.9 GM/DL
MCV RBC AUTO: 80 FL
MONOCYTES # BLD AUTO: 0.78 K/UL
MONOCYTES NFR BLD AUTO: 6.2 %
NEUTROPHILS # BLD AUTO: 7.62 K/UL
NEUTROPHILS NFR BLD AUTO: 60.1 %
PARATHYROID HORMONE INTACT: 27 PG/ML
PHOSPHATE SERPL-MCNC: 2.6 MG/DL
PLATELET # BLD AUTO: 335 K/UL
POTASSIUM SERPL-SCNC: 4.4 MMOL/L
PROT SERPL-MCNC: 7.2 G/DL
RBC # BLD: 4.59 M/UL
RBC # FLD: 16.6 %
SAVE SPECIMEN: NORMAL
SODIUM SERPL-SCNC: 140 MMOL/L
T4 FREE SERPL-MCNC: 0.8 NG/DL
T4 SERPL-MCNC: 6.5 UG/DL
TSH SERPL-ACNC: 7.48 UIU/ML
VIT B12 SERPL-MCNC: 325 PG/ML
WBC # FLD AUTO: 12.67 K/UL

## 2020-11-09 NOTE — DISCHARGE NOTE OB - DO YOU HAVE DIFFICULTY CLIMBING STAIRS
Referred by: ZEN Ruvalcaba; Medical Diagnosis (from order):    Diagnosis Information      Diagnosis    724.4 (ICD-9-CM) - M54.16 (ICD-10-CM) - Radiculopathy of lumbar region    722.80 (ICD-9-CM) - M96.1 (ICD-10-CM) - Postlaminectomy syndrome                Physical Therapy -  Daily Treatment Note    Visit:  4     SUBJECTIVE                                                                                                             Pt states she has more pain in both hips and across her back.  Felt ok after last session but woke up in more pain today.  Functional Change: Pt states she has less pain in her calves since starting therapy.    Pain / Symptoms:  Pain rating (out of 10): Current: 6   Quality / Description: sharp, shooting, sore, ache.  Alleviating Factors: change in position, heat, over-the-counter medication.     OBJECTIVE                                                                                                                        TREATMENT                                                                                                                  Therapeutic Exercise:  Nustep level 4, seat # 7 x 7 min  Posterior pelvic tilt x 20  LTR x 10  Hook lying pillow squeezes with 3 sec hold x 20      Manual Therapy:  White foam roller to B ITB, DTM to B Piriformis, STM to lumbar region. Long axis distraction B x 1 each with 30 sec hold  PROM B IR/ER    Skilled input: as detailed above    Writer verbally educated and received verbal consent for hand placement, positioning of patient, and techniques to be performed today from patient for hand placement and palpation for techniques as described above and how they are pertinent to the patient's plan of care.    Home Exercise Program: JOSE, LTR, supported seated trunk flexion, lateral hip shift, sktc>90/90  Lower ab brace into towel     ASSESSMENT                                                                                                              Pt responds well to manual therapy and white foam roller with less pain reported in B hips.  She ambulated with more normalized gait pattern after session.  Pain/symptoms after session: 4    Patient Education:   Results of above outlined education: Demonstrates understanding and Verbalizes understanding      PLAN                                                                                                                           Suggestions for next session as indicated: Progress per plan of care:  Assess last session, add to HEP and have perform JOSE.          Procedures and total treatment time documented Time Entry flowsheet.   No

## 2020-11-19 ENCOUNTER — APPOINTMENT (OUTPATIENT)
Dept: ENDOCRINOLOGY | Facility: CLINIC | Age: 40
End: 2020-11-19
Payer: COMMERCIAL

## 2020-11-19 VITALS
HEIGHT: 58 IN | SYSTOLIC BLOOD PRESSURE: 104 MMHG | TEMPERATURE: 98 F | BODY MASS INDEX: 40.96 KG/M2 | WEIGHT: 195.13 LBS | OXYGEN SATURATION: 96 % | DIASTOLIC BLOOD PRESSURE: 72 MMHG | HEART RATE: 73 BPM

## 2020-11-19 LAB
ACE BLD-CCNC: 49 U/L
BASOPHILS # BLD AUTO: 0.01 K/UL
BASOPHILS NFR BLD AUTO: 0.1 %
EOSINOPHIL # BLD AUTO: 0.16 K/UL
EOSINOPHIL NFR BLD AUTO: 1.4 %
ERYTHROCYTE [SEDIMENTATION RATE] IN BLOOD BY WESTERGREN METHOD: 28 MM/HR
FERRITIN SERPL-MCNC: 9 NG/ML
GLUCOSE BLDC GLUCOMTR-MCNC: 107
HBA1C MFR BLD HPLC: 5.8
HCT VFR BLD CALC: 35.2 %
HGB BLD-MCNC: 10.3 G/DL
IMM GRANULOCYTES NFR BLD AUTO: 0.4 %
IRON SATN MFR SERPL: 7 %
IRON SERPL-MCNC: 25 UG/DL
LYMPHOCYTES # BLD AUTO: 4.08 K/UL
LYMPHOCYTES NFR BLD AUTO: 36 %
MAN DIFF?: NORMAL
MCHC RBC-ENTMCNC: 23.4 PG
MCHC RBC-ENTMCNC: 29.3 GM/DL
MCV RBC AUTO: 80 FL
MONOCYTES # BLD AUTO: 0.61 K/UL
MONOCYTES NFR BLD AUTO: 5.4 %
NEUTROPHILS # BLD AUTO: 6.42 K/UL
NEUTROPHILS NFR BLD AUTO: 56.7 %
PLATELET # BLD AUTO: 368 K/UL
RBC # BLD: 4.4 M/UL
RBC # FLD: 17.2 %
SAVE SPECIMEN: NORMAL
TIBC SERPL-MCNC: 363 UG/DL
UIBC SERPL-MCNC: 338 UG/DL
WBC # FLD AUTO: 11.32 K/UL

## 2020-11-19 PROCEDURE — 99214 OFFICE O/P EST MOD 30 MIN: CPT | Mod: 25

## 2020-11-19 PROCEDURE — 82962 GLUCOSE BLOOD TEST: CPT

## 2020-11-19 PROCEDURE — 83036 HEMOGLOBIN GLYCOSYLATED A1C: CPT | Mod: QW

## 2020-11-19 NOTE — ASSESSMENT
[FreeTextEntry1] : 1. Hypothyroidism/Thyroid nodule - She will continue Levothyroxine 50 mcg 6 days and 25 mcg 1 day. TFTs done today. Pending FNA will discuss further treatment \par 2. IGT/Obesity - We did discuss the implications of the insulin resistance. She will start Ozempic 0.25 mg weekly for 2 weeks and then increase to 0.5 mg weekly if tolerating. She will try to follow a lower carb diet. I have recommended she f/u with the RD for additional education. \par We discussed the importance of weight loss in the management of her comorbidities. We discussed the risks of continued excess weight on long term health. \par 3. Elevated WBC/Lymph - We did discuss that she should have a Heme eval due to elevated WBCs/Lymph and ACE Enzyme being negative. Did discuss the need to r/o granulatomous disease. \par \par f/u in 6 weeks\par  [Diabetes Foot Care] : diabetes foot care [Long Term Vascular Complications] : long term vascular complications of diabetes [Carbohydrate Consistent Diet] : carbohydrate consistent diet [Importance of Diet and Exercise] : importance of diet and exercise to improve glycemic control, achieve weight loss and improve cardiovascular health [Exercise/Effect on Glucose] : exercise/effect on glucose [Self Monitoring of Blood Glucose] : self monitoring of blood glucose [Injection Technique, Storage, Sharps Disposal] : injection technique, storage, and sharps disposal [Retinopathy Screening] : Patient was referred to ophthalmology for retinopathy screening [Weight Loss] : weight loss [Diabetic Medications] : Risks and benefits of diabetic medications were discussed [Levothyroxine] : The patient was instructed to take Levothyroxine on an empty stomach, separate from vitamins, and wait at least 30 minutes before eating

## 2020-11-19 NOTE — HISTORY OF PRESENT ILLNESS
[FreeTextEntry1] : Patient presents for follow up of hypothyroidism, IGT and obesity. Presently she is on Levothyroxine 50 mcg. Was supposed to be taking only 6 days a week but has been taking Levothyroxine 25 mcg 1 day a week. She went for a thyroid sono, which showed multiple thyroid nodules. The largest on the right side which is 1.8 cm. She did have a previous thyroid sono which also showed a 1.8 cm nodule, which was biopsied. The sono also showed a 1 cm exophytic nodule for which additional workup is needed. She has an appt later today to go for the thyroid bx.\par She continues to struggle with her weight. She states despite following a healthy diet and exercising she is unable to lose weight. \par She does have a h/o gestational diabetes and was on insulin. Recent A1C is 6. Recent labs show insulin resistance. She did lose 5 lbs since her last visit. 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 She also has sleep apnea.\par We reviewed her recent labs. Her Vit D and B12 are low. She has been taking supplements since. Overnight DST was normal but on the higher side. She did read the Obesity Code.\par She had recent right shoulder arthroscopy. \par Labs continue to show an elevated WBC and lymphs. ACE enzyme was negative. Anemic as well, with low iron.

## 2020-11-30 ENCOUNTER — TRANSCRIPTION ENCOUNTER (OUTPATIENT)
Age: 40
End: 2020-11-30

## 2020-12-01 ENCOUNTER — TRANSCRIPTION ENCOUNTER (OUTPATIENT)
Age: 40
End: 2020-12-01

## 2020-12-18 ENCOUNTER — TRANSCRIPTION ENCOUNTER (OUTPATIENT)
Age: 40
End: 2020-12-18

## 2020-12-28 ENCOUNTER — RX RENEWAL (OUTPATIENT)
Age: 40
End: 2020-12-28

## 2021-01-05 ENCOUNTER — APPOINTMENT (OUTPATIENT)
Dept: ENDOCRINOLOGY | Facility: CLINIC | Age: 41
End: 2021-01-05
Payer: COMMERCIAL

## 2021-01-05 VITALS
BODY MASS INDEX: 38.54 KG/M2 | DIASTOLIC BLOOD PRESSURE: 66 MMHG | TEMPERATURE: 98.1 F | OXYGEN SATURATION: 96 % | HEIGHT: 58 IN | WEIGHT: 183.6 LBS | SYSTOLIC BLOOD PRESSURE: 111 MMHG | HEART RATE: 75 BPM

## 2021-01-05 LAB — GLUCOSE BLDC GLUCOMTR-MCNC: 93

## 2021-01-05 PROCEDURE — 99214 OFFICE O/P EST MOD 30 MIN: CPT | Mod: 25

## 2021-01-05 PROCEDURE — G0447 BEHAVIOR COUNSEL OBESITY 15M: CPT

## 2021-01-05 PROCEDURE — 99072 ADDL SUPL MATRL&STAF TM PHE: CPT

## 2021-01-05 PROCEDURE — 82962 GLUCOSE BLOOD TEST: CPT

## 2021-01-05 PROCEDURE — 36415 COLL VENOUS BLD VENIPUNCTURE: CPT

## 2021-01-05 NOTE — REASON FOR VISIT
[Follow - Up] : a follow-up visit [DM Type 2] : DM Type 2 [Hypothyroidism] : hypothyroidism [Thyroid nodule/ MNG] : thyroid nodule/ MNG [Weight Management/Obesity] : weight management/obesity

## 2021-01-05 NOTE — PHYSICAL EXAM
[Alert] : alert [Well Nourished] : well nourished [No Acute Distress] : no acute distress [Well Developed] : well developed [Normal Sclera/Conjunctiva] : normal sclera/conjunctiva [EOMI] : extra ocular movement intact [No Proptosis] : no proptosis [Normal Oropharynx] : the oropharynx was normal [Thyroid Not Enlarged] : the thyroid was not enlarged [No Thyroid Nodules] : no palpable thyroid nodules [No Respiratory Distress] : no respiratory distress [No Accessory Muscle Use] : no accessory muscle use [Clear to Auscultation] : lungs were clear to auscultation bilaterally [Normal S1, S2] : normal S1 and S2 [Normal Rate] : heart rate was normal [Regular Rhythm] : with a regular rhythm [No Edema] : no peripheral edema [Pedal Pulses Normal] : the pedal pulses are present [Normal Bowel Sounds] : normal bowel sounds [Not Tender] : non-tender [Not Distended] : not distended [Soft] : abdomen soft [Normal Anterior Cervical Nodes] : no anterior cervical lymphadenopathy [No Spinal Tenderness] : no spinal tenderness [Spine Straight] : spine straight [Normal Gait] : normal gait [Normal Strength/Tone] : muscle strength and tone were normal [No Rash] : no rash [Acanthosis Nigricans] : no acanthosis nigricans [Hirsutism] : hirsutism present [Normal] : normal [Full ROM] : with full range of motion [Diminished Throughout Both Feet] : normal tactile sensation with monofilament testing throughout both feet [Normal Reflexes] : deep tendon reflexes were 2+ and symmetric [No Tremors] : no tremors [Oriented x3] : oriented to person, place, and time

## 2021-01-05 NOTE — ASSESSMENT
[FreeTextEntry1] : 1. Hypothyroidism/Thyroid nodule - She will continue Levothyroxine 50 mcg 6 days. TFTs done today. Repeat thyroid sono 6-12 months, \par 2. IGT/Obesity - Continue Metformin and Ozempic. Continue to focus on following a healthy lifestyle. We discussed the importance of weight loss in the management of her comorbidities. We discussed the risks of continued excess weight on long term health. \par 3. Elevated WBC/Lymph - f/u with HemOnc\par \par f/u in 6 weeks\par Fasting labs done today, will call with results.  [Diabetes Foot Care] : diabetes foot care [Long Term Vascular Complications] : long term vascular complications of diabetes [Carbohydrate Consistent Diet] : carbohydrate consistent diet [Importance of Diet and Exercise] : importance of diet and exercise to improve glycemic control, achieve weight loss and improve cardiovascular health [Exercise/Effect on Glucose] : exercise/effect on glucose [Self Monitoring of Blood Glucose] : self monitoring of blood glucose [Injection Technique, Storage, Sharps Disposal] : injection technique, storage, and sharps disposal [Retinopathy Screening] : Patient was referred to ophthalmology for retinopathy screening [Weight Loss] : weight loss [Diabetic Medications] : Risks and benefits of diabetic medications were discussed [Levothyroxine] : The patient was instructed to take Levothyroxine on an empty stomach, separate from vitamins, and wait at least 30 minutes before eating

## 2021-01-05 NOTE — HISTORY OF PRESENT ILLNESS
[FreeTextEntry1] : Patient presents for follow up of hypothyroidism, IGT and obesity. Presently she is on Levothyroxine 50 mcg 6 days a week. She went for a thyroid sono, which showed multiple thyroid nodules. Largest nodule 1.8 cm was biopsied and is benign. \par She has been making changes to her diet and is exercising at the gym several times a week. She has lost 12 lbs since her last visit. \par She does have a h/o gestational diabetes and was on insulin. Recent A1C is 5.8. She is taking Metformin 1000 mg BID and Ozempic 0.5 mg weekly. Denies any GI side effects. 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 \par Labs continue to show an elevated WBC and lymphs. ACE enzyme was negative. Anemic as well, with low iron. Has an appt with HemOnc this week.

## 2021-01-06 ENCOUNTER — OUTPATIENT (OUTPATIENT)
Dept: OUTPATIENT SERVICES | Facility: HOSPITAL | Age: 41
LOS: 1 days | Discharge: HOME | End: 2021-01-06

## 2021-01-06 DIAGNOSIS — D72.829 ELEVATED WHITE BLOOD CELL COUNT, UNSPECIFIED: ICD-10-CM

## 2021-01-06 DIAGNOSIS — D69.6 THROMBOCYTOPENIA, UNSPECIFIED: ICD-10-CM

## 2021-01-07 ENCOUNTER — RESULT REVIEW (OUTPATIENT)
Age: 41
End: 2021-01-07

## 2021-01-07 ENCOUNTER — APPOINTMENT (OUTPATIENT)
Dept: HEMATOLOGY ONCOLOGY | Facility: CLINIC | Age: 41
End: 2021-01-07
Payer: COMMERCIAL

## 2021-01-07 VITALS
TEMPERATURE: 97.5 F | HEART RATE: 75 BPM | DIASTOLIC BLOOD PRESSURE: 71 MMHG | RESPIRATION RATE: 16 BRPM | HEIGHT: 58.27 IN | BODY MASS INDEX: 38.08 KG/M2 | SYSTOLIC BLOOD PRESSURE: 115 MMHG | WEIGHT: 183.87 LBS | OXYGEN SATURATION: 99 %

## 2021-01-07 DIAGNOSIS — D72.829 ELEVATED WHITE BLOOD CELL COUNT, UNSPECIFIED: ICD-10-CM

## 2021-01-07 LAB
BASOPHILS # BLD AUTO: 0.04 K/UL — SIGNIFICANT CHANGE UP (ref 0–0.2)
BASOPHILS NFR BLD AUTO: 0.3 % — SIGNIFICANT CHANGE UP (ref 0–2)
EOSINOPHIL # BLD AUTO: 0.15 K/UL — SIGNIFICANT CHANGE UP (ref 0–0.5)
EOSINOPHIL NFR BLD AUTO: 1.2 % — SIGNIFICANT CHANGE UP (ref 0–6)
ERYTHROCYTE [SEDIMENTATION RATE] IN BLOOD: 37 MM/HR — HIGH (ref 0–15)
HCT VFR BLD CALC: 36 % — SIGNIFICANT CHANGE UP (ref 34.5–45)
HGB BLD-MCNC: 11.3 G/DL — LOW (ref 11.5–15.5)
IMM GRANULOCYTES NFR BLD AUTO: 0.6 % — SIGNIFICANT CHANGE UP (ref 0–1.5)
LYMPHOCYTES # BLD AUTO: 28.4 % — SIGNIFICANT CHANGE UP (ref 13–44)
LYMPHOCYTES # BLD AUTO: 3.51 K/UL — HIGH (ref 1–3.3)
MCHC RBC-ENTMCNC: 24.3 PG — LOW (ref 27–34)
MCHC RBC-ENTMCNC: 31.4 G/DL — LOW (ref 32–36)
MCV RBC AUTO: 77.4 FL — LOW (ref 80–100)
MONOCYTES # BLD AUTO: 0.71 K/UL — SIGNIFICANT CHANGE UP (ref 0–0.9)
MONOCYTES NFR BLD AUTO: 5.8 % — SIGNIFICANT CHANGE UP (ref 2–14)
NEUTROPHILS # BLD AUTO: 7.86 K/UL — HIGH (ref 1.8–7.4)
NEUTROPHILS NFR BLD AUTO: 63.7 % — SIGNIFICANT CHANGE UP (ref 43–77)
NRBC # BLD: 0 /100 WBCS — SIGNIFICANT CHANGE UP (ref 0–0)
PLATELET # BLD AUTO: 364 K/UL — SIGNIFICANT CHANGE UP (ref 150–400)
RBC # BLD: 4.65 M/UL — SIGNIFICANT CHANGE UP (ref 3.8–5.2)
RBC # FLD: 15 % — HIGH (ref 10.3–14.5)
WBC # BLD: 12.34 K/UL — HIGH (ref 3.8–10.5)
WBC # FLD AUTO: 12.34 K/UL — HIGH (ref 3.8–10.5)

## 2021-01-07 PROCEDURE — 99072 ADDL SUPL MATRL&STAF TM PHE: CPT

## 2021-01-07 PROCEDURE — 99205 OFFICE O/P NEW HI 60 MIN: CPT
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
## 2021-01-08 NOTE — END OF VISIT
[] : Fellow [FreeTextEntry3] : Patient with a history of mild leukocytosis since as far back as 2015, likely reactive but will plan on ruling out an underlying myeloproliferative disorder.  Send her peripheral blood for a Jak2 mutation, FISH bcr-abl along with a flow cytometry. \par She also has an iron deficiency due to menstrual blood loss, recent pregnancies.  She will start oral SloFe daily on an empty stomach.  She will take stool softeners if she gets any constipation.  Plan to repeat her labs in 3 months.  She will call to review results in 1 week.

## 2021-01-08 NOTE — HISTORY OF PRESENT ILLNESS
[de-identified] : 41 y/o female with a h/o hypothyroidism, benign thyroid nodules, gestational diabetes, referred to hematology for evaluation of lymphocytosis. Has been seeing endocrine for management of gestational diabetes, and was noted to have elevated WBC 13.7 August 2020 with ALC 3.7. October 2020 her WBC was 12.6 with ALC 4.02 and Nov 2020 WBC 11.3 with ALC 4.08. ANC elevated in August and October 2020, normal Nov 2020. Of note, her WBC in 2015 was mildly elevated. She has had no fevers, night sweats, cough, abn pain, appreciable new lymphadenopathy, no h/o splenectomy. Intentionally has lost 20 pounds through diet, management of diabetes. Recent shoulder arthroscopy Oct 2020, no fam history of CLL of lymphoma (fam h/o breast cancer-mom and dad-hamartomas requiring abn surgery), no allergies or new medications around time elevated ALC first noticed and diff without eosinophilia. No history of tobacco use. Meds include metformin, ozempic, synthroid. Last used OCP >4 years ago. Has shift work sleep disorder and takes Nuvigil prn. 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 \par \par Also has been anemic since Oct 2020, hgb in 10s. Labs consistent with CARYL. Has serum iron 25 and ferritin 9. TIBC 363. Menstrual cycle regular without heavy periods. Non-vegetarian.  Has had 4 pregnancies, the last two in 2018, 2016, no breast feeding after.  Her four pregnancies had complicated deliveries, the last with bleeding, required 2 units of PRBC.

## 2021-01-08 NOTE — REVIEW OF SYSTEMS
[Patient Intake Form Reviewed] : Patient intake form was reviewed [Fever] : no fever [Eye Pain] : no eye pain [Mucosal Pain] : no mucosal pain [Chest Pain] : no chest pain [Shortness Of Breath] : no shortness of breath [Abdominal Pain] : no abdominal pain [Dysuria] : no dysuria [Joint Pain] : no joint pain [Skin Rash] : no skin rash [Fainting] : no fainting [Muscle Weakness] : no muscle weakness [Easy Bleeding] : no tendency for easy bleeding

## 2021-01-11 LAB
25(OH)D3 SERPL-MCNC: 24.7 NG/ML
ALBUMIN SERPL ELPH-MCNC: 4.4 G/DL
ALP BLD-CCNC: 57 U/L
ALT SERPL-CCNC: 9 U/L
ANION GAP SERPL CALC-SCNC: 15 MMOL/L
AST SERPL-CCNC: 11 U/L
BILIRUB SERPL-MCNC: 0.3 MG/DL
BUN SERPL-MCNC: 10 MG/DL
CALCIUM SERPL-MCNC: 9.6 MG/DL
CHLORIDE SERPL-SCNC: 101 MMOL/L
CHOLEST SERPL-MCNC: 171 MG/DL
CO2 SERPL-SCNC: 22 MMOL/L
CREAT SERPL-MCNC: 0.75 MG/DL
FOLATE SERPL-MCNC: 5.7 NG/ML
GLUCOSE SERPL-MCNC: 91 MG/DL
HDLC SERPL-MCNC: 39 MG/DL
LDLC SERPL CALC-MCNC: 93 MG/DL
NONHDLC SERPL-MCNC: 132 MG/DL
POTASSIUM SERPL-SCNC: 4.1 MMOL/L
PROT SERPL-MCNC: 7.4 G/DL
SAVE SPECIMEN: NORMAL
SODIUM SERPL-SCNC: 138 MMOL/L
T4 FREE SERPL-MCNC: 1.2 NG/DL
T4 SERPL-MCNC: 8.8 UG/DL
TRIGL SERPL-MCNC: 195 MG/DL
TSH SERPL-ACNC: 2.24 UIU/ML
VIT B12 SERPL-MCNC: 319 PG/ML

## 2021-01-15 ENCOUNTER — TRANSCRIPTION ENCOUNTER (OUTPATIENT)
Age: 41
End: 2021-01-15

## 2021-02-16 ENCOUNTER — APPOINTMENT (OUTPATIENT)
Dept: ENDOCRINOLOGY | Facility: CLINIC | Age: 41
End: 2021-02-16
Payer: COMMERCIAL

## 2021-02-16 VITALS
HEIGHT: 58.27 IN | WEIGHT: 180.19 LBS | SYSTOLIC BLOOD PRESSURE: 115 MMHG | DIASTOLIC BLOOD PRESSURE: 80 MMHG | HEART RATE: 71 BPM | OXYGEN SATURATION: 96 % | BODY MASS INDEX: 37.31 KG/M2 | TEMPERATURE: 97.7 F

## 2021-02-16 LAB
GLUCOSE BLDC GLUCOMTR-MCNC: 97
HBA1C MFR BLD HPLC: 5.5

## 2021-02-16 PROCEDURE — 99072 ADDL SUPL MATRL&STAF TM PHE: CPT

## 2021-02-16 PROCEDURE — 83036 HEMOGLOBIN GLYCOSYLATED A1C: CPT | Mod: QW

## 2021-02-16 PROCEDURE — 99213 OFFICE O/P EST LOW 20 MIN: CPT | Mod: 25

## 2021-02-16 PROCEDURE — 82962 GLUCOSE BLOOD TEST: CPT

## 2021-02-16 PROCEDURE — G0447 BEHAVIOR COUNSEL OBESITY 15M: CPT
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
## 2021-02-16 NOTE — ASSESSMENT
[Diabetes Foot Care] : diabetes foot care [Long Term Vascular Complications] : long term vascular complications of diabetes [Carbohydrate Consistent Diet] : carbohydrate consistent diet [Importance of Diet and Exercise] : importance of diet and exercise to improve glycemic control, achieve weight loss and improve cardiovascular health [Exercise/Effect on Glucose] : exercise/effect on glucose [Self Monitoring of Blood Glucose] : self monitoring of blood glucose [Injection Technique, Storage, Sharps Disposal] : injection technique, storage, and sharps disposal [Retinopathy Screening] : Patient was referred to ophthalmology for retinopathy screening [Weight Loss] : weight loss [Diabetic Medications] : Risks and benefits of diabetic medications were discussed [Levothyroxine] : The patient was instructed to take Levothyroxine on an empty stomach, separate from vitamins, and wait at least 30 minutes before eating [FreeTextEntry1] : 1. Hypothyroidism/Thyroid nodule - She will continue Levothyroxine 50 mcg 6 days. Repeat thyroid sono 6-12 months, \par 2. IGT/Obesity - Continue Metformin and Ozempic. Continue to focus on following a healthy lifestyle. We discussed the importance of weight loss in the management of her comorbidities. We discussed the risks of continued excess weight on long term health.  We discussed at length the importance of following a carbohydrate balanced diet and the importance of incorporating protein in meals. We also discussed appropriate alternative food choices. We discussed ways she can incorporate exercise into her daily routine. Can consider increasing Ozempic at f/u. \par 3. Elevated WBC/Lymph - f/u with HemOnc\par \par f/u in 6 weeks\par

## 2021-03-23 ENCOUNTER — TRANSCRIPTION ENCOUNTER (OUTPATIENT)
Age: 41
End: 2021-03-23

## 2021-04-08 ENCOUNTER — OUTPATIENT (OUTPATIENT)
Dept: OUTPATIENT SERVICES | Facility: HOSPITAL | Age: 41
LOS: 1 days | Discharge: HOME | End: 2021-04-08

## 2021-04-08 DIAGNOSIS — D69.6 THROMBOCYTOPENIA, UNSPECIFIED: ICD-10-CM

## 2021-04-12 ENCOUNTER — RESULT REVIEW (OUTPATIENT)
Age: 41
End: 2021-04-12

## 2021-04-12 ENCOUNTER — APPOINTMENT (OUTPATIENT)
Dept: HEMATOLOGY ONCOLOGY | Facility: CLINIC | Age: 41
End: 2021-04-12
Payer: COMMERCIAL

## 2021-04-12 VITALS
SYSTOLIC BLOOD PRESSURE: 135 MMHG | DIASTOLIC BLOOD PRESSURE: 66 MMHG | HEIGHT: 59.06 IN | BODY MASS INDEX: 36.44 KG/M2 | WEIGHT: 180.78 LBS | HEART RATE: 80 BPM | RESPIRATION RATE: 17 BRPM | TEMPERATURE: 97.2 F | OXYGEN SATURATION: 99 %

## 2021-04-12 LAB
BASOPHILS # BLD AUTO: 0.04 K/UL — SIGNIFICANT CHANGE UP (ref 0–0.2)
BASOPHILS NFR BLD AUTO: 0.4 % — SIGNIFICANT CHANGE UP (ref 0–2)
EOSINOPHIL # BLD AUTO: 0.14 K/UL — SIGNIFICANT CHANGE UP (ref 0–0.5)
EOSINOPHIL NFR BLD AUTO: 1.3 % — SIGNIFICANT CHANGE UP (ref 0–6)
HCT VFR BLD CALC: 34.7 % — SIGNIFICANT CHANGE UP (ref 34.5–45)
HGB BLD-MCNC: 10.4 G/DL — LOW (ref 11.5–15.5)
IMM GRANULOCYTES NFR BLD AUTO: 0.5 % — SIGNIFICANT CHANGE UP (ref 0–1.5)
LYMPHOCYTES # BLD AUTO: 3.8 K/UL — HIGH (ref 1–3.3)
LYMPHOCYTES # BLD AUTO: 34.2 % — SIGNIFICANT CHANGE UP (ref 13–44)
MCHC RBC-ENTMCNC: 23.7 PG — LOW (ref 27–34)
MCHC RBC-ENTMCNC: 30 G/DL — LOW (ref 32–36)
MCV RBC AUTO: 79.2 FL — LOW (ref 80–100)
MONOCYTES # BLD AUTO: 0.67 K/UL — SIGNIFICANT CHANGE UP (ref 0–0.9)
MONOCYTES NFR BLD AUTO: 6 % — SIGNIFICANT CHANGE UP (ref 2–14)
NEUTROPHILS # BLD AUTO: 6.4 K/UL — SIGNIFICANT CHANGE UP (ref 1.8–7.4)
NEUTROPHILS NFR BLD AUTO: 57.6 % — SIGNIFICANT CHANGE UP (ref 43–77)
NRBC # BLD: 0 /100 WBCS — SIGNIFICANT CHANGE UP (ref 0–0)
PLATELET # BLD AUTO: 354 K/UL — SIGNIFICANT CHANGE UP (ref 150–400)
RBC # BLD: 4.38 M/UL — SIGNIFICANT CHANGE UP (ref 3.8–5.2)
RBC # FLD: 15.4 % — HIGH (ref 10.3–14.5)
WBC # BLD: 11.1 K/UL — HIGH (ref 3.8–10.5)
WBC # FLD AUTO: 11.1 K/UL — HIGH (ref 3.8–10.5)

## 2021-04-12 PROCEDURE — 99072 ADDL SUPL MATRL&STAF TM PHE: CPT

## 2021-04-12 PROCEDURE — 99214 OFFICE O/P EST MOD 30 MIN: CPT

## 2021-04-16 NOTE — ASSESSMENT
[FreeTextEntry1] : 40F h/o hypothyroid, benign thyroid nodules, gestational diabetes, referred to hematology for evaluation of lymphocytosis seen on labs Oct and Nov 2020, ALC 4.02>>4.08:\par \par Mild leukocytosis/lymphocytosis \par CBC today WBC 11.1, ALC 3.80.\par Peripheral blood flow cytometry negative. \par MPN work up negative including Jak2, FISH bcr-abl.  \par \par Iron deficiency anemia.  \par Hg has not responded to oral iron supplementation. \par Will plan on 2 doses of IV feraheme.  \par -GI referral given h/o GI hamartomas (father) who required surgery \par \par Follow up in 2-3 months.

## 2021-04-16 NOTE — HISTORY OF PRESENT ILLNESS
[de-identified] : 41 y/o female with a h/o hypothyroidism, benign thyroid nodules, gestational diabetes, referred to hematology for evaluation of lymphocytosis. Has been seeing endocrine for management of gestational diabetes, and was noted to have elevated WBC 13.7 August 2020 with ALC 3.7. October 2020 her WBC was 12.6 with ALC 4.02 and Nov 2020 WBC 11.3 with ALC 4.08. ANC elevated in August and October 2020, normal Nov 2020. Of note, her WBC in 2015 was mildly elevated. She has had no fevers, night sweats, cough, abn pain, appreciable new lymphadenopathy, no h/o splenectomy. Intentionally has lost 20 pounds through diet, management of diabetes. Recent shoulder arthroscopy Oct 2020, no fam history of CLL of lymphoma (fam h/o breast cancer-mom and dad-hamartomas requiring abn surgery), no allergies or new medications around time elevated ALC first noticed and diff without eosinophilia. No history of tobacco use. Meds include metformin, ozempic, synthroid. Last used OCP >4 years ago. Has shift work sleep disorder and takes Nuvigil prn. 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 \par \par Also has been anemic since Oct 2020, hgb in 10s. Labs consistent with CARYL. Has serum iron 25 and ferritin 9. TIBC 363. Menstrual cycle regular without heavy periods. Non-vegetarian.  Has had 4 pregnancies, the last two in 2018, 2016, no breast feeding after.  Her four pregnancies had complicated deliveries, the last with bleeding, required 2 units of PRBC.   [de-identified] : She continues to have fatigue.  She has been taking the oral iron but misses a few days due to concern for constipation.  She denies any dizziness, no chest pain, no SOB, no abdominal pain, no bleeding.

## 2021-04-21 ENCOUNTER — APPOINTMENT (OUTPATIENT)
Dept: ENDOCRINOLOGY | Facility: CLINIC | Age: 41
End: 2021-04-21
Payer: COMMERCIAL

## 2021-04-21 ENCOUNTER — LABORATORY RESULT (OUTPATIENT)
Age: 41
End: 2021-04-21

## 2021-04-21 VITALS
HEIGHT: 59.06 IN | TEMPERATURE: 96.4 F | BODY MASS INDEX: 35.88 KG/M2 | WEIGHT: 178 LBS | DIASTOLIC BLOOD PRESSURE: 82 MMHG | OXYGEN SATURATION: 98 % | SYSTOLIC BLOOD PRESSURE: 110 MMHG

## 2021-04-21 LAB
GLUCOSE BLDC GLUCOMTR-MCNC: 124
HBA1C MFR BLD HPLC: 5.2

## 2021-04-21 PROCEDURE — 99072 ADDL SUPL MATRL&STAF TM PHE: CPT

## 2021-04-21 PROCEDURE — 83036 HEMOGLOBIN GLYCOSYLATED A1C: CPT | Mod: QW

## 2021-04-21 PROCEDURE — 99213 OFFICE O/P EST LOW 20 MIN: CPT | Mod: 25

## 2021-04-21 PROCEDURE — G0447 BEHAVIOR COUNSEL OBESITY 15M: CPT

## 2021-04-21 PROCEDURE — 82962 GLUCOSE BLOOD TEST: CPT

## 2021-04-22 NOTE — ASSESSMENT
[FreeTextEntry1] : 1. Hypothyroidism/Thyroid nodule - She will continue Levothyroxine 50 mcg 6 days. Repeat thyroid sono 6-12 months, \par 2. IGT/Obesity - Continue Metformin and Ozempic. Continue to focus on following a healthy lifestyle. We discussed the importance of weight loss in the management of her comorbidities. We discussed the risks of continued excess weight on long term health.  We discussed at length the importance of following a carbohydrate balanced diet and the importance of incorporating protein in meals. We also discussed appropriate alternative food choices. We discussed ways she can incorporate exercise into her daily routine. Can consider increasing Ozempic at f/u. \par 3. Elevated WBC/Lymph - f/u with HemOnc\par \par f/u in 3 months\par  [Diabetes Foot Care] : diabetes foot care [Long Term Vascular Complications] : long term vascular complications of diabetes [Carbohydrate Consistent Diet] : carbohydrate consistent diet [Importance of Diet and Exercise] : importance of diet and exercise to improve glycemic control, achieve weight loss and improve cardiovascular health [Exercise/Effect on Glucose] : exercise/effect on glucose [Self Monitoring of Blood Glucose] : self monitoring of blood glucose [Injection Technique, Storage, Sharps Disposal] : injection technique, storage, and sharps disposal [Retinopathy Screening] : Patient was referred to ophthalmology for retinopathy screening [Weight Loss] : weight loss [Diabetic Medications] : Risks and benefits of diabetic medications were discussed [Levothyroxine] : The patient was instructed to take Levothyroxine on an empty stomach, separate from vitamins, and wait at least 30 minutes before eating
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
## 2021-04-26 LAB
25(OH)D3 SERPL-MCNC: 20.9 NG/ML
ALBUMIN SERPL ELPH-MCNC: 4.4 G/DL
ALP BLD-CCNC: 58 U/L
ALT SERPL-CCNC: 11 U/L
ANION GAP SERPL CALC-SCNC: 12 MMOL/L
APPEARANCE: ABNORMAL
AST SERPL-CCNC: 14 U/L
BILIRUB SERPL-MCNC: 0.2 MG/DL
BILIRUBIN URINE: NEGATIVE
BLOOD URINE: NEGATIVE
BUN SERPL-MCNC: 10 MG/DL
CALCIUM SERPL-MCNC: 9.9 MG/DL
CHLORIDE SERPL-SCNC: 103 MMOL/L
CHOLEST SERPL-MCNC: 185 MG/DL
CO2 SERPL-SCNC: 24 MMOL/L
COLOR: NORMAL
CREAT SERPL-MCNC: 0.65 MG/DL
CREAT SPEC-SCNC: 247 MG/DL
GLUCOSE QUALITATIVE U: NEGATIVE
GLUCOSE SERPL-MCNC: 95 MG/DL
HDLC SERPL-MCNC: 40 MG/DL
KETONES URINE: NORMAL
LDLC SERPL CALC-MCNC: 97 MG/DL
LEUKOCYTE ESTERASE URINE: NEGATIVE
MICROALBUMIN 24H UR DL<=1MG/L-MCNC: 1.7 MG/DL
MICROALBUMIN/CREAT 24H UR-RTO: 7 MG/G
NITRITE URINE: NEGATIVE
NONHDLC SERPL-MCNC: 145 MG/DL
PH URINE: 6
POTASSIUM SERPL-SCNC: 4.1 MMOL/L
PROT SERPL-MCNC: 7.6 G/DL
PROTEIN URINE: ABNORMAL
SODIUM SERPL-SCNC: 139 MMOL/L
SPECIFIC GRAVITY URINE: 1.04
T4 FREE SERPL-MCNC: 1.2 NG/DL
T4 SERPL-MCNC: 8.2 UG/DL
TRIGL SERPL-MCNC: 241 MG/DL
TSH SERPL-ACNC: 1.79 UIU/ML
UROBILINOGEN URINE: NORMAL

## 2021-04-29 ENCOUNTER — APPOINTMENT (OUTPATIENT)
Dept: INFUSION THERAPY | Facility: HOSPITAL | Age: 41
End: 2021-04-29

## 2021-05-06 ENCOUNTER — APPOINTMENT (OUTPATIENT)
Dept: INFUSION THERAPY | Facility: HOSPITAL | Age: 41
End: 2021-05-06

## 2021-05-09 NOTE — DISCHARGE NOTE OB - PERSISTENT HEADACHE, BLURRED VISION
Problem: NORMAL   Goal: Experiences normal transition  Description: INTERVENTIONS:  - Assess and monitor vital signs and lab values.   - Encourage skin-to-skin with caregiver for thermoregulation  - Assess signs, symptoms and risk factors for hypog Statement Selected

## 2021-06-15 ENCOUNTER — RX RENEWAL (OUTPATIENT)
Age: 41
End: 2021-06-15

## 2021-06-29 ENCOUNTER — OUTPATIENT (OUTPATIENT)
Dept: OUTPATIENT SERVICES | Facility: HOSPITAL | Age: 41
LOS: 1 days | Discharge: HOME | End: 2021-06-29

## 2021-06-29 DIAGNOSIS — D72.829 ELEVATED WHITE BLOOD CELL COUNT, UNSPECIFIED: ICD-10-CM

## 2021-06-30 ENCOUNTER — APPOINTMENT (OUTPATIENT)
Dept: HEMATOLOGY ONCOLOGY | Facility: CLINIC | Age: 41
End: 2021-06-30
Payer: COMMERCIAL

## 2021-06-30 ENCOUNTER — RESULT REVIEW (OUTPATIENT)
Age: 41
End: 2021-06-30

## 2021-06-30 VITALS
HEIGHT: 58.5 IN | SYSTOLIC BLOOD PRESSURE: 134 MMHG | HEART RATE: 86 BPM | WEIGHT: 180.78 LBS | TEMPERATURE: 97.3 F | BODY MASS INDEX: 36.94 KG/M2 | DIASTOLIC BLOOD PRESSURE: 79 MMHG | RESPIRATION RATE: 17 BRPM | OXYGEN SATURATION: 98 %

## 2021-06-30 LAB
BASOPHILS # BLD AUTO: 0 K/UL — SIGNIFICANT CHANGE UP (ref 0–0.2)
BASOPHILS NFR BLD AUTO: 0 % — SIGNIFICANT CHANGE UP (ref 0–2)
EOSINOPHIL # BLD AUTO: 0.32 K/UL — SIGNIFICANT CHANGE UP (ref 0–0.5)
EOSINOPHIL NFR BLD AUTO: 2 % — SIGNIFICANT CHANGE UP (ref 0–6)
HCT VFR BLD CALC: 41.6 % — SIGNIFICANT CHANGE UP (ref 34.5–45)
HGB BLD-MCNC: 13.7 G/DL — SIGNIFICANT CHANGE UP (ref 11.5–15.5)
LYMPHOCYTES # BLD AUTO: 26 % — SIGNIFICANT CHANGE UP (ref 13–44)
LYMPHOCYTES # BLD AUTO: 4.14 K/UL — HIGH (ref 1–3.3)
MCHC RBC-ENTMCNC: 28.1 PG — SIGNIFICANT CHANGE UP (ref 27–34)
MCHC RBC-ENTMCNC: 32.7 G/DL — SIGNIFICANT CHANGE UP (ref 32–36)
MCV RBC AUTO: 86 FL — SIGNIFICANT CHANGE UP (ref 80–100)
MONOCYTES # BLD AUTO: 0.8 K/UL — SIGNIFICANT CHANGE UP (ref 0–0.9)
MONOCYTES NFR BLD AUTO: 5 % — SIGNIFICANT CHANGE UP (ref 2–14)
NEUTROPHILS # BLD AUTO: 10.67 K/UL — HIGH (ref 1.8–7.4)
NEUTROPHILS NFR BLD AUTO: 67 % — SIGNIFICANT CHANGE UP (ref 43–77)
NRBC # BLD: 0 /100 — SIGNIFICANT CHANGE UP (ref 0–0)
NRBC # BLD: SIGNIFICANT CHANGE UP /100 WBCS (ref 0–0)
PLAT MORPH BLD: NORMAL — SIGNIFICANT CHANGE UP
PLATELET # BLD AUTO: 331 K/UL — SIGNIFICANT CHANGE UP (ref 150–400)
RBC # BLD: 4.84 M/UL — SIGNIFICANT CHANGE UP (ref 3.8–5.2)
RBC # FLD: 18 % — HIGH (ref 10.3–14.5)
RBC BLD AUTO: SIGNIFICANT CHANGE UP
WBC # BLD: 15.92 K/UL — HIGH (ref 3.8–10.5)
WBC # FLD AUTO: 15.92 K/UL — HIGH (ref 3.8–10.5)

## 2021-06-30 PROCEDURE — 99213 OFFICE O/P EST LOW 20 MIN: CPT

## 2021-07-10 NOTE — HISTORY OF PRESENT ILLNESS
[de-identified] : 39 y/o female with a h/o hypothyroidism, benign thyroid nodules, gestational diabetes, referred to hematology for evaluation of lymphocytosis. Has been seeing endocrine for management of gestational diabetes, and was noted to have elevated WBC 13.7 August 2020 with ALC 3.7. October 2020 her WBC was 12.6 with ALC 4.02 and Nov 2020 WBC 11.3 with ALC 4.08. ANC elevated in August and October 2020, normal Nov 2020. Of note, her WBC in 2015 was mildly elevated. She has had no fevers, night sweats, cough, abn pain, appreciable new lymphadenopathy, no h/o splenectomy. Intentionally has lost 20 pounds through diet, management of diabetes. Recent shoulder arthroscopy Oct 2020, no fam history of CLL of lymphoma (fam h/o breast cancer-mom and dad-hamartomas requiring abn surgery), no allergies or new medications around time elevated ALC first noticed and diff without eosinophilia. No history of tobacco use. Meds include metformin, ozempic, synthroid. Last used OCP >4 years ago. Has shift work sleep disorder and takes Nuvigil prn. 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 \par \par Also has been anemic since Oct 2020, hgb in 10s. Labs consistent with CARYL. Has serum iron 25 and ferritin 9. TIBC 363. Menstrual cycle regular without heavy periods. Non-vegetarian.  Has had 4 pregnancies, the last two in 2018, 2016, no breast feeding after.  Her four pregnancies had complicated deliveries, the last with bleeding, required 2 units of PRBC.   [de-identified] : She is here for a follow up appointment.  She received 2 doses of feraheme on 4/29 and 5/6/21.  Since then she feels much improved, increased energy.  Not lightheaded, not dizzy, no chest pain, no SOB, no abdominal, n/v/d, no fever/chills.

## 2021-07-10 NOTE — REVIEW OF SYSTEMS
[Patient Intake Form Reviewed] : Patient intake form was reviewed [Negative] : Allergic/Immunologic [Fever] : no fever [Chills] : no chills [Night Sweats] : no night sweats [Fatigue] : no fatigue [Recent Change In Weight] : ~T no recent weight change [Easy Bleeding] : no tendency for easy bleeding

## 2021-07-20 LAB
AMINO ACID: NORMAL
ASSAY DETAILS: NORMAL
BLOCK/SPECIMEN ID: NORMAL
CRP SERPL-MCNC: 0.41 MG/DL
EXON: NORMAL
FERRITIN SERPL-MCNC: 52 NG/ML
FERRITIN SERPL-MCNC: 6 NG/ML
FOLATE SERPL-MCNC: 10.2 NG/ML
FOLATE SERPL-MCNC: 7.9 NG/ML
GENE: NORMAL
HBV CORE IGG+IGM SER QL: NONREACTIVE
HBV SURFACE AB SER QL: REACTIVE
HBV SURFACE AG SER QL: NONREACTIVE
HCV AB SER QL: NONREACTIVE
HCV S/CO RATIO: 0.12 S/CO
HIV1+2 AB SPEC QL IA.RAPID: NONREACTIVE
IRON SATN MFR SERPL: 14 %
IRON SATN MFR SERPL: 5 %
IRON SERPL-MCNC: 21 UG/DL
IRON SERPL-MCNC: 48 UG/DL
JAK2 12 INTERPRETATION: NORMAL
JAK2 12 MUTATIONS: NORMAL
JAK2 12 REFERENCE: NORMAL
JAK2 12-13 TYPE: NORMAL
JAK2 GENE MUT ANL BLD/T: NORMAL
JAK2 P.V617F BLD/T QL: NOT DETECTED
JAK2-SOURCE: NORMAL
Lab: NORMAL
NUKLEOID CHANGE: NORMAL
TIBC SERPL-MCNC: 351 UG/DL
TIBC SERPL-MCNC: 464 UG/DL
UIBC SERPL-MCNC: 303 UG/DL
UIBC SERPL-MCNC: 443 UG/DL
VIT B12 SERPL-MCNC: 265 PG/ML
VIT B12 SERPL-MCNC: 572 PG/ML

## 2021-07-21 ENCOUNTER — APPOINTMENT (OUTPATIENT)
Dept: ENDOCRINOLOGY | Facility: CLINIC | Age: 41
End: 2021-07-21
Payer: COMMERCIAL

## 2021-07-21 ENCOUNTER — LABORATORY RESULT (OUTPATIENT)
Age: 41
End: 2021-07-21

## 2021-07-21 VITALS
DIASTOLIC BLOOD PRESSURE: 79 MMHG | OXYGEN SATURATION: 98 % | HEIGHT: 58 IN | SYSTOLIC BLOOD PRESSURE: 135 MMHG | BODY MASS INDEX: 39.67 KG/M2 | WEIGHT: 189 LBS | TEMPERATURE: 98.3 F | HEART RATE: 74 BPM

## 2021-07-21 LAB
GLUCOSE BLDC GLUCOMTR-MCNC: 91
HBA1C MFR BLD HPLC: 5.6

## 2021-07-21 PROCEDURE — 82962 GLUCOSE BLOOD TEST: CPT

## 2021-07-21 PROCEDURE — 83036 HEMOGLOBIN GLYCOSYLATED A1C: CPT | Mod: QW

## 2021-07-21 PROCEDURE — G0447 BEHAVIOR COUNSEL OBESITY 15M: CPT

## 2021-07-21 PROCEDURE — 99214 OFFICE O/P EST MOD 30 MIN: CPT | Mod: 25
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
## 2021-07-21 NOTE — ASSESSMENT
[FreeTextEntry1] : 1. Hypothyroidism/Thyroid nodule - She will continue Levothyroxine 50 mcg 6 days. Repeat thyroid sono in August.  Check TFTs today. \par 2. IGT/Obesity - Continue Metformin and Ozempic. We did discuss switching from Ozempic to Wegovy to help with weight loss which seems to be cause of her elevated A1C and DORIAN. She will refocus efforts on weight loss and lifestyle modifications. Sample of Ozempic given until she can get Wegovy. \par 3. Elevated WBC/Lymph - f/u with HemOnc\par \par Fasting labs done today in the office, will call with results. \par \par At least 15 minutes was spent during the visit on obesity counseling. \par \par f/u in 6 weeks\par  [Diabetes Foot Care] : diabetes foot care [Long Term Vascular Complications] : long term vascular complications of diabetes [Carbohydrate Consistent Diet] : carbohydrate consistent diet [Importance of Diet and Exercise] : importance of diet and exercise to improve glycemic control, achieve weight loss and improve cardiovascular health [Exercise/Effect on Glucose] : exercise/effect on glucose [Self Monitoring of Blood Glucose] : self monitoring of blood glucose [Injection Technique, Storage, Sharps Disposal] : injection technique, storage, and sharps disposal [Retinopathy Screening] : Patient was referred to ophthalmology for retinopathy screening [Weight Loss] : weight loss [Diabetic Medications] : Risks and benefits of diabetic medications were discussed [Levothyroxine] : The patient was instructed to take Levothyroxine on an empty stomach, separate from vitamins, and wait at least 30 minutes before eating

## 2021-07-26 ENCOUNTER — TRANSCRIPTION ENCOUNTER (OUTPATIENT)
Age: 41
End: 2021-07-26

## 2021-07-26 LAB
25(OH)D3 SERPL-MCNC: 30.1 NG/ML
ALBUMIN SERPL ELPH-MCNC: 4.3 G/DL
ALP BLD-CCNC: 54 U/L
ALT SERPL-CCNC: 13 U/L
ANION GAP SERPL CALC-SCNC: 14 MMOL/L
APPEARANCE: ABNORMAL
AST SERPL-CCNC: 17 U/L
BILIRUB SERPL-MCNC: 0.2 MG/DL
BILIRUBIN URINE: NEGATIVE
BLOOD URINE: NEGATIVE
BUN SERPL-MCNC: 9 MG/DL
CALCIUM SERPL-MCNC: 9.6 MG/DL
CHLORIDE SERPL-SCNC: 104 MMOL/L
CHOLEST SERPL-MCNC: 178 MG/DL
CO2 SERPL-SCNC: 24 MMOL/L
COLOR: YELLOW
CREAT SERPL-MCNC: 0.72 MG/DL
CREAT SPEC-SCNC: 179 MG/DL
GLUCOSE QUALITATIVE U: NEGATIVE
GLUCOSE SERPL-MCNC: 93 MG/DL
HDLC SERPL-MCNC: 42 MG/DL
KETONES URINE: NEGATIVE
LDLC SERPL CALC-MCNC: 85 MG/DL
LEUKOCYTE ESTERASE URINE: NEGATIVE
MICROALBUMIN 24H UR DL<=1MG/L-MCNC: <1.2 MG/DL
MICROALBUMIN/CREAT 24H UR-RTO: NORMAL MG/G
NITRITE URINE: NEGATIVE
NONHDLC SERPL-MCNC: 136 MG/DL
PH URINE: 6
POTASSIUM SERPL-SCNC: 4.2 MMOL/L
PROT SERPL-MCNC: 7.3 G/DL
PROTEIN URINE: NORMAL
SODIUM SERPL-SCNC: 142 MMOL/L
SPECIFIC GRAVITY URINE: 1.03
T4 FREE SERPL-MCNC: 1.2 NG/DL
T4 SERPL-MCNC: 8.4 UG/DL
TRIGL SERPL-MCNC: 254 MG/DL
TSH SERPL-ACNC: 2.35 UIU/ML
UROBILINOGEN URINE: NORMAL

## 2021-08-31 ENCOUNTER — APPOINTMENT (OUTPATIENT)
Dept: ENDOCRINOLOGY | Facility: CLINIC | Age: 41
End: 2021-08-31

## 2021-10-08 ENCOUNTER — RX RENEWAL (OUTPATIENT)
Age: 41
End: 2021-10-08

## 2021-11-03 ENCOUNTER — APPOINTMENT (OUTPATIENT)
Dept: ENDOCRINOLOGY | Facility: CLINIC | Age: 41
End: 2021-11-03
Payer: COMMERCIAL

## 2021-11-03 VITALS
HEIGHT: 58 IN | OXYGEN SATURATION: 97 % | DIASTOLIC BLOOD PRESSURE: 69 MMHG | WEIGHT: 183 LBS | HEART RATE: 75 BPM | SYSTOLIC BLOOD PRESSURE: 108 MMHG | BODY MASS INDEX: 38.41 KG/M2

## 2021-11-03 LAB
GLUCOSE BLDC GLUCOMTR-MCNC: 130
HBA1C MFR BLD HPLC: 5.5

## 2021-11-03 PROCEDURE — G0447 BEHAVIOR COUNSEL OBESITY 15M: CPT

## 2021-11-03 PROCEDURE — 99214 OFFICE O/P EST MOD 30 MIN: CPT | Mod: 25

## 2021-11-03 PROCEDURE — 82962 GLUCOSE BLOOD TEST: CPT

## 2021-11-03 PROCEDURE — 83036 HEMOGLOBIN GLYCOSYLATED A1C: CPT | Mod: QW

## 2021-11-03 NOTE — ASSESSMENT
[Diabetes Foot Care] : diabetes foot care [Long Term Vascular Complications] : long term vascular complications of diabetes [Carbohydrate Consistent Diet] : carbohydrate consistent diet [Importance of Diet and Exercise] : importance of diet and exercise to improve glycemic control, achieve weight loss and improve cardiovascular health [Exercise/Effect on Glucose] : exercise/effect on glucose [Self Monitoring of Blood Glucose] : self monitoring of blood glucose [Injection Technique, Storage, Sharps Disposal] : injection technique, storage, and sharps disposal [Retinopathy Screening] : Patient was referred to ophthalmology for retinopathy screening [Weight Loss] : weight loss [Diabetic Medications] : Risks and benefits of diabetic medications were discussed [Levothyroxine] : The patient was instructed to take Levothyroxine on an empty stomach, separate from vitamins, and wait at least 30 minutes before eating [FreeTextEntry1] : 1. Hypothyroidism/Thyroid nodule - She will continue Levothyroxine 50 mcg 6 days. Has appt next week for thyroid sono. \par 2. IGT/Obesity - Continue Metformin and try to resume Wegovy. Continue to focus on lifestyle modifications. Diet discussed again. We discussed ways she can incorporate exercise into her daily routine. We discussed the importance of weight loss in the management of her comorbidities. We discussed the risks of continued excess weight on long term health. \par 3. Elevated WBC/Lymph - f/u with HemOnc\par \par Patient to complete labs, will call with the results. \par \par At least 15 minutes was spent during the visit on obesity counseling. \par \par f/u in 8-10 weeks\par
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
## 2021-11-07 ENCOUNTER — RX RENEWAL (OUTPATIENT)
Age: 41
End: 2021-11-07

## 2021-11-08 ENCOUNTER — TRANSCRIPTION ENCOUNTER (OUTPATIENT)
Age: 41
End: 2021-11-08

## 2021-11-08 LAB
25(OH)D3 SERPL-MCNC: 22.3 NG/ML
ALBUMIN SERPL ELPH-MCNC: 4.4 G/DL
ALP BLD-CCNC: 60 U/L
ALT SERPL-CCNC: 11 U/L
ANION GAP SERPL CALC-SCNC: 13 MMOL/L
APPEARANCE: CLEAR
AST SERPL-CCNC: 14 U/L
BASOPHILS # BLD AUTO: 0.04 K/UL
BASOPHILS NFR BLD AUTO: 0.3 %
BILIRUB SERPL-MCNC: 0.2 MG/DL
BILIRUBIN URINE: NEGATIVE
BLOOD URINE: NEGATIVE
BUN SERPL-MCNC: 8 MG/DL
CALCIUM SERPL-MCNC: 9.8 MG/DL
CHLORIDE SERPL-SCNC: 103 MMOL/L
CHOLEST SERPL-MCNC: 171 MG/DL
CO2 SERPL-SCNC: 25 MMOL/L
COLOR: NORMAL
CREAT SERPL-MCNC: 0.73 MG/DL
CREAT SPEC-SCNC: 164 MG/DL
EOSINOPHIL # BLD AUTO: 0.36 K/UL
EOSINOPHIL NFR BLD AUTO: 2.6 %
FOLATE SERPL-MCNC: 7.9 NG/ML
GLUCOSE QUALITATIVE U: NEGATIVE
GLUCOSE SERPL-MCNC: 105 MG/DL
HCT VFR BLD CALC: 43.6 %
HDLC SERPL-MCNC: 37 MG/DL
HGB BLD-MCNC: 14.1 G/DL
IMM GRANULOCYTES NFR BLD AUTO: 0.5 %
KETONES URINE: NEGATIVE
LDLC SERPL CALC-MCNC: 97 MG/DL
LEUKOCYTE ESTERASE URINE: NEGATIVE
LYMPHOCYTES # BLD AUTO: 4.68 K/UL
LYMPHOCYTES NFR BLD AUTO: 33.4 %
MAN DIFF?: NORMAL
MCHC RBC-ENTMCNC: 29.6 PG
MCHC RBC-ENTMCNC: 32.3 GM/DL
MCV RBC AUTO: 91.6 FL
MICROALBUMIN 24H UR DL<=1MG/L-MCNC: <1.2 MG/DL
MICROALBUMIN/CREAT 24H UR-RTO: NORMAL MG/G
MONOCYTES # BLD AUTO: 0.87 K/UL
MONOCYTES NFR BLD AUTO: 6.2 %
NEUTROPHILS # BLD AUTO: 8 K/UL
NEUTROPHILS NFR BLD AUTO: 57 %
NITRITE URINE: NEGATIVE
NONHDLC SERPL-MCNC: 134 MG/DL
PH URINE: 6
PLATELET # BLD AUTO: 361 K/UL
POTASSIUM SERPL-SCNC: 4.3 MMOL/L
PROT SERPL-MCNC: 7.1 G/DL
PROTEIN URINE: NEGATIVE
RBC # BLD: 4.76 M/UL
RBC # FLD: 13.1 %
SODIUM SERPL-SCNC: 141 MMOL/L
SPECIFIC GRAVITY URINE: 1.02
T4 FREE SERPL-MCNC: 1.1 NG/DL
T4 SERPL-MCNC: 7.3 UG/DL
TRIGL SERPL-MCNC: 189 MG/DL
TSH SERPL-ACNC: 3.38 UIU/ML
UROBILINOGEN URINE: NORMAL
VIT B12 SERPL-MCNC: 269 PG/ML
WBC # FLD AUTO: 14.02 K/UL

## 2021-12-14 ENCOUNTER — OUTPATIENT (OUTPATIENT)
Dept: OUTPATIENT SERVICES | Facility: HOSPITAL | Age: 41
LOS: 1 days | Discharge: HOME | End: 2021-12-14

## 2021-12-14 DIAGNOSIS — D72.829 ELEVATED WHITE BLOOD CELL COUNT, UNSPECIFIED: ICD-10-CM

## 2021-12-15 ENCOUNTER — RESULT REVIEW (OUTPATIENT)
Age: 41
End: 2021-12-15

## 2021-12-15 ENCOUNTER — APPOINTMENT (OUTPATIENT)
Dept: HEMATOLOGY ONCOLOGY | Facility: CLINIC | Age: 41
End: 2021-12-15
Payer: COMMERCIAL

## 2021-12-15 VITALS
SYSTOLIC BLOOD PRESSURE: 102 MMHG | BODY MASS INDEX: 36.57 KG/M2 | HEART RATE: 69 BPM | RESPIRATION RATE: 16 BRPM | OXYGEN SATURATION: 98 % | WEIGHT: 179.02 LBS | HEIGHT: 58.58 IN | TEMPERATURE: 97.3 F | DIASTOLIC BLOOD PRESSURE: 62 MMHG

## 2021-12-15 LAB
BASOPHILS # BLD AUTO: 0.05 K/UL — SIGNIFICANT CHANGE UP (ref 0–0.2)
BASOPHILS NFR BLD AUTO: 0.3 % — SIGNIFICANT CHANGE UP (ref 0–2)
EOSINOPHIL # BLD AUTO: 0.07 K/UL — SIGNIFICANT CHANGE UP (ref 0–0.5)
EOSINOPHIL NFR BLD AUTO: 0.5 % — SIGNIFICANT CHANGE UP (ref 0–6)
HCT VFR BLD CALC: 42.1 % — SIGNIFICANT CHANGE UP (ref 34.5–45)
HGB BLD-MCNC: 13.5 G/DL — SIGNIFICANT CHANGE UP (ref 11.5–15.5)
IMM GRANULOCYTES NFR BLD AUTO: 0.8 % — SIGNIFICANT CHANGE UP (ref 0–1.5)
LYMPHOCYTES # BLD AUTO: 25 % — SIGNIFICANT CHANGE UP (ref 13–44)
LYMPHOCYTES # BLD AUTO: 3.7 K/UL — HIGH (ref 1–3.3)
MCHC RBC-ENTMCNC: 28.6 PG — SIGNIFICANT CHANGE UP (ref 27–34)
MCHC RBC-ENTMCNC: 32.1 G/DL — SIGNIFICANT CHANGE UP (ref 32–36)
MCV RBC AUTO: 89.2 FL — SIGNIFICANT CHANGE UP (ref 80–100)
MONOCYTES # BLD AUTO: 0.99 K/UL — HIGH (ref 0–0.9)
MONOCYTES NFR BLD AUTO: 6.7 % — SIGNIFICANT CHANGE UP (ref 2–14)
NEUTROPHILS # BLD AUTO: 9.89 K/UL — HIGH (ref 1.8–7.4)
NEUTROPHILS NFR BLD AUTO: 66.7 % — SIGNIFICANT CHANGE UP (ref 43–77)
NRBC # BLD: 0 /100 WBCS — SIGNIFICANT CHANGE UP (ref 0–0)
PLATELET # BLD AUTO: 348 K/UL — SIGNIFICANT CHANGE UP (ref 150–400)
RBC # BLD: 4.72 M/UL — SIGNIFICANT CHANGE UP (ref 3.8–5.2)
RBC # FLD: 13.2 % — SIGNIFICANT CHANGE UP (ref 10.3–14.5)
WBC # BLD: 14.82 K/UL — HIGH (ref 3.8–10.5)
WBC # FLD AUTO: 14.82 K/UL — HIGH (ref 3.8–10.5)

## 2021-12-15 PROCEDURE — 99214 OFFICE O/P EST MOD 30 MIN: CPT

## 2021-12-16 ENCOUNTER — RX RENEWAL (OUTPATIENT)
Age: 41
End: 2021-12-16

## 2021-12-16 RX ORDER — METFORMIN ER 500 MG 500 MG/1
500 TABLET ORAL
Qty: 360 | Refills: 1 | Status: ACTIVE | COMMUNITY
Start: 2020-09-30 | End: 1900-01-01

## 2021-12-16 NOTE — HISTORY OF PRESENT ILLNESS
[de-identified] : Patient  returns for follow up of her iron deficiency anemia.  Hg 13.5g/dl today.  \par \par Patient had 2 children in short time period, was severely anemic this past fall 2020.  Recieved 2 doses of Ferraheme good response Hg 13.7 after treatment.  \par Now has 4 children.   \par Menstrual periods now regular not too heavy.  ALso lost weight with the help of endocrinology, lost 25 lbs.  \par Patient never had ovarian cysts, no PCOS.  \par Had a workup for thrombocytosis in January, negative flow cytometry, negative FISh and OG 2.   Platelets continue to rise slightly despite negative wokrup and correction of the iron deficiency.  \par Nuvigil PRN\par Off ozempic, but is now on Wegovy.  \par \par Donna had been on iron tablets in the past, tried the dissolvable variety, no effect. Patient has been taking them twice a week on routine.

## 2021-12-21 ENCOUNTER — TRANSCRIPTION ENCOUNTER (OUTPATIENT)
Age: 41
End: 2021-12-21

## 2021-12-27 LAB
FERRITIN SERPL-MCNC: 21 NG/ML
FOLATE SERPL-MCNC: 7.7 NG/ML
IRON SATN MFR SERPL: 18 %
IRON SERPL-MCNC: 62 UG/DL
TIBC SERPL-MCNC: 352 UG/DL
UIBC SERPL-MCNC: 289 UG/DL
VIT B12 SERPL-MCNC: 296 PG/ML

## 2022-01-31 ENCOUNTER — RX RENEWAL (OUTPATIENT)
Age: 42
End: 2022-01-31

## 2022-02-04 ENCOUNTER — APPOINTMENT (OUTPATIENT)
Dept: ENDOCRINOLOGY | Facility: CLINIC | Age: 42
End: 2022-02-04
Payer: COMMERCIAL

## 2022-02-04 VITALS
WEIGHT: 181.25 LBS | HEART RATE: 83 BPM | HEIGHT: 58 IN | SYSTOLIC BLOOD PRESSURE: 121 MMHG | OXYGEN SATURATION: 99 % | DIASTOLIC BLOOD PRESSURE: 78 MMHG | BODY MASS INDEX: 38.04 KG/M2

## 2022-02-04 DIAGNOSIS — D64.9 ANEMIA, UNSPECIFIED: ICD-10-CM

## 2022-02-04 LAB
GLUCOSE BLDC GLUCOMTR-MCNC: 86
HBA1C MFR BLD HPLC: 5

## 2022-02-04 PROCEDURE — G0447 BEHAVIOR COUNSEL OBESITY 15M: CPT

## 2022-02-04 PROCEDURE — 99214 OFFICE O/P EST MOD 30 MIN: CPT | Mod: 25

## 2022-02-04 PROCEDURE — 82962 GLUCOSE BLOOD TEST: CPT

## 2022-02-04 PROCEDURE — 83036 HEMOGLOBIN GLYCOSYLATED A1C: CPT | Mod: QW

## 2022-02-04 RX ORDER — SEMAGLUTIDE 1.34 MG/ML
2 INJECTION, SOLUTION SUBCUTANEOUS
Qty: 4.5 | Refills: 2 | Status: DISCONTINUED | COMMUNITY
Start: 2020-11-19 | End: 2022-02-04

## 2022-02-05 PROBLEM — D64.9 ANEMIA: Status: ACTIVE | Noted: 2020-10-08

## 2022-02-05 NOTE — HISTORY OF PRESENT ILLNESS
[FreeTextEntry1] : Patient presents for follow up of hypothyroidism, IGT and obesity. Presently she is on Levothyroxine 50 mcg 6 days a week. She went for a thyroid sono, which showed multiple thyroid nodules. Largest nodule 1.8 cm was biopsied and is benign. Has not yet gone for f/u.\par She continues to follow a healthy lifestyle. 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 She did lose 2 lbs since her last visit. She does have a h/o gestational diabetes and was on insulin.  A1C is 5 down from 5.6. She is taking Metformin 1000 mg BID and Wegovy 2.4mg weekly. 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 She also has sleep apnea.\par She is being followed by Hem/Onc. There does not appear to be any concern regarding her elevated WBCs.\par No recent labs.

## 2022-02-05 NOTE — ASSESSMENT
[Diabetes Foot Care] : diabetes foot care [Long Term Vascular Complications] : long term vascular complications of diabetes [Carbohydrate Consistent Diet] : carbohydrate consistent diet [Importance of Diet and Exercise] : importance of diet and exercise to improve glycemic control, achieve weight loss and improve cardiovascular health [Exercise/Effect on Glucose] : exercise/effect on glucose [Self Monitoring of Blood Glucose] : self monitoring of blood glucose [Injection Technique, Storage, Sharps Disposal] : injection technique, storage, and sharps disposal [Retinopathy Screening] : Patient was referred to ophthalmology for retinopathy screening [Weight Loss] : weight loss [Diabetic Medications] : Risks and benefits of diabetic medications were discussed [Levothyroxine] : The patient was instructed to take Levothyroxine on an empty stomach, separate from vitamins, and wait at least 30 minutes before eating [FreeTextEntry1] : 1. Hypothyroidism/Thyroid nodule - She will continue Levothyroxine 50 mcg 6 days. Must go for f/u thyroid sono!\par 2. IGT/Obesity - Continue Metformin and Wegovy. Continue to focus on lifestyle modifications. Diet discussed again. We discussed ways she can incorporate exercise into her daily routine. We discussed the importance of weight loss in the management of her comorbidities. We discussed the risks of continued excess weight on long term health. \par 3. Elevated WBC/Lymph - f/u with HemOnc\par \par Fasting labs done today in the office, will call with results. \par \par At least 15 minutes was spent during the visit on obesity counseling. \par \par f/u in 8 weeks\par

## 2022-02-08 LAB
25(OH)D3 SERPL-MCNC: 15 NG/ML
ALBUMIN SERPL ELPH-MCNC: 4.2 G/DL
ALP BLD-CCNC: 59 U/L
ALT SERPL-CCNC: 14 U/L
ANION GAP SERPL CALC-SCNC: 13 MMOL/L
AST SERPL-CCNC: 16 U/L
BASOPHILS # BLD AUTO: 0.03 K/UL
BASOPHILS NFR BLD AUTO: 0.3 %
BILIRUB SERPL-MCNC: 0.3 MG/DL
BUN SERPL-MCNC: 9 MG/DL
CALCIUM SERPL-MCNC: 9.8 MG/DL
CHLORIDE SERPL-SCNC: 102 MMOL/L
CO2 SERPL-SCNC: 24 MMOL/L
CREAT SERPL-MCNC: 0.71 MG/DL
EOSINOPHIL # BLD AUTO: 0.1 K/UL
EOSINOPHIL NFR BLD AUTO: 0.8 %
GLUCOSE SERPL-MCNC: 99 MG/DL
HCT VFR BLD CALC: 41.2 %
HGB BLD-MCNC: 13.2 G/DL
IMM GRANULOCYTES NFR BLD AUTO: 0.3 %
LYMPHOCYTES # BLD AUTO: 3.89 K/UL
LYMPHOCYTES NFR BLD AUTO: 32.6 %
MAN DIFF?: NORMAL
MCHC RBC-ENTMCNC: 28.8 PG
MCHC RBC-ENTMCNC: 32 GM/DL
MCV RBC AUTO: 90 FL
MONOCYTES # BLD AUTO: 0.72 K/UL
MONOCYTES NFR BLD AUTO: 6 %
NEUTROPHILS # BLD AUTO: 7.15 K/UL
NEUTROPHILS NFR BLD AUTO: 60 %
PLATELET # BLD AUTO: 359 K/UL
POTASSIUM SERPL-SCNC: 4.1 MMOL/L
PROT SERPL-MCNC: 7.5 G/DL
RBC # BLD: 4.58 M/UL
RBC # FLD: 13.3 %
SODIUM SERPL-SCNC: 139 MMOL/L
T4 FREE SERPL-MCNC: 1.1 NG/DL
T4 SERPL-MCNC: 8.3 UG/DL
TSH SERPL-ACNC: 1.24 UIU/ML
WBC # FLD AUTO: 11.93 K/UL

## 2022-03-21 ENCOUNTER — NON-APPOINTMENT (OUTPATIENT)
Age: 42
End: 2022-03-21

## 2022-03-25 ENCOUNTER — APPOINTMENT (OUTPATIENT)
Dept: ENDOCRINOLOGY | Facility: CLINIC | Age: 42
End: 2022-03-25
Payer: COMMERCIAL

## 2022-03-25 VITALS
OXYGEN SATURATION: 93 % | SYSTOLIC BLOOD PRESSURE: 136 MMHG | WEIGHT: 176.38 LBS | HEART RATE: 67 BPM | BODY MASS INDEX: 37.02 KG/M2 | HEIGHT: 58 IN | DIASTOLIC BLOOD PRESSURE: 74 MMHG

## 2022-03-25 LAB — GLUCOSE BLDC GLUCOMTR-MCNC: 72

## 2022-03-25 PROCEDURE — 99214 OFFICE O/P EST MOD 30 MIN: CPT | Mod: 25

## 2022-03-25 PROCEDURE — G0447 BEHAVIOR COUNSEL OBESITY 15M: CPT

## 2022-03-25 PROCEDURE — 82962 GLUCOSE BLOOD TEST: CPT

## 2022-03-26 NOTE — PHYSICAL EXAM
[Alert] : alert [Well Nourished] : well nourished [No Acute Distress] : no acute distress [Well Developed] : well developed [Normal Sclera/Conjunctiva] : normal sclera/conjunctiva [EOMI] : extra ocular movement intact [No Proptosis] : no proptosis [Thyroid Not Enlarged] : the thyroid was not enlarged [No Thyroid Nodules] : no palpable thyroid nodules [No Respiratory Distress] : no respiratory distress [No Accessory Muscle Use] : no accessory muscle use [Clear to Auscultation] : lungs were clear to auscultation bilaterally [Normal S1, S2] : normal S1 and S2 [Normal Rate] : heart rate was normal [Regular Rhythm] : with a regular rhythm [No Edema] : no peripheral edema [Pedal Pulses Normal] : the pedal pulses are present [Normal Bowel Sounds] : normal bowel sounds [Not Tender] : non-tender [Not Distended] : not distended [Soft] : abdomen soft [Normal Anterior Cervical Nodes] : no anterior cervical lymphadenopathy [No Spinal Tenderness] : no spinal tenderness [Spine Straight] : spine straight [Normal Gait] : normal gait [Normal Strength/Tone] : muscle strength and tone were normal [No Rash] : no rash [Hirsutism] : hirsutism present [Normal] : normal [Full ROM] : with full range of motion [Normal Reflexes] : deep tendon reflexes were 2+ and symmetric [No Tremors] : no tremors [Oriented x3] : oriented to person, place, and time [Acanthosis Nigricans] : no acanthosis nigricans [Diminished Throughout Both Feet] : normal tactile sensation with monofilament testing throughout both feet

## 2022-03-26 NOTE — ASSESSMENT
[Diabetes Foot Care] : diabetes foot care [Long Term Vascular Complications] : long term vascular complications of diabetes [Carbohydrate Consistent Diet] : carbohydrate consistent diet [Importance of Diet and Exercise] : importance of diet and exercise to improve glycemic control, achieve weight loss and improve cardiovascular health [Exercise/Effect on Glucose] : exercise/effect on glucose [Self Monitoring of Blood Glucose] : self monitoring of blood glucose [Injection Technique, Storage, Sharps Disposal] : injection technique, storage, and sharps disposal [Retinopathy Screening] : Patient was referred to ophthalmology for retinopathy screening [Weight Loss] : weight loss [Diabetic Medications] : Risks and benefits of diabetic medications were discussed [Levothyroxine] : The patient was instructed to take Levothyroxine on an empty stomach, separate from vitamins, and wait at least 30 minutes before eating [FreeTextEntry1] : 1. Hypothyroidism/Thyroid nodule - She will continue Levothyroxine 50 mcg 6 days. Will repeat thyroid sono in 6 months 9/2022.\par 2. IGT/Obesity - Continue Metformin and Wegovy. Continue to focus on lifestyle modifications. Diet discussed again. We discussed ways she can incorporate exercise into her daily routine. We discussed the importance of weight loss in the management of her comorbidities. We discussed the risks of continued excess weight on long term health. \par 3. Elevated WBC/Lymph - f/u with HemOnc\par 4. Vit D - She started Vit D 2000 iu daily. Continue to take. \par \par At least 15 minutes was spent during the visit on obesity counseling. \par \par f/u in 8 weeks\par

## 2022-03-26 NOTE — HISTORY OF PRESENT ILLNESS
[FreeTextEntry1] : Patient presents for follow up of hypothyroidism, IGT and obesity. Presently she is on Levothyroxine 50 mcg 6 days a week. She went for a thyroid sono, which showed multiple thyroid nodules. Largest nodule 1.8 cm was biopsied and is benign. With new subcm nodules now seen.\par She continues to follow a healthy lifestyle. She has resumed going back to the gym. She did lose 5 lbs since her last visit. She does have a h/o gestational diabetes and was on insulin.  A1C is 5 down from 5.6. She is taking Metformin 1000 mg BID and Wegovy 2.4mg weekly. 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 She also has sleep apnea.\par She is being followed by Hem/Onc. There does not appear to be any concern regarding her elevated WBCs.\par Recent labs were reviewed and discussed.

## 2022-04-05 ENCOUNTER — RX RENEWAL (OUTPATIENT)
Age: 42
End: 2022-04-05

## 2022-05-20 ENCOUNTER — APPOINTMENT (OUTPATIENT)
Dept: ENDOCRINOLOGY | Facility: CLINIC | Age: 42
End: 2022-05-20
Payer: COMMERCIAL

## 2022-05-20 VITALS
SYSTOLIC BLOOD PRESSURE: 114 MMHG | HEART RATE: 97 BPM | HEIGHT: 58 IN | OXYGEN SATURATION: 97 % | DIASTOLIC BLOOD PRESSURE: 74 MMHG | WEIGHT: 169 LBS | BODY MASS INDEX: 35.48 KG/M2

## 2022-05-20 PROCEDURE — 99213 OFFICE O/P EST LOW 20 MIN: CPT | Mod: 25

## 2022-05-20 PROCEDURE — G0447 BEHAVIOR COUNSEL OBESITY 15M: CPT

## 2022-05-22 NOTE — ASSESSMENT
[FreeTextEntry1] : 1. Hypothyroidism/Thyroid nodule - She will continue Levothyroxine 50 mcg 6 days. Will repeat thyroid sono in 6 months 9/2022.\par 2. IGT/Obesity - Continue Metformin and Wegovy. Continue to focus on lifestyle modifications. Diet discussed again. We discussed ways she can incorporate exercise into her daily routine. We discussed the importance of weight loss in the management of her comorbidities. We discussed the risks of continued excess weight on long term health. \par 3. Elevated WBC/Lymph - f/u with HemOnc\par 4. Vit D - She started Vit D 2000 iu daily. Continue to take. \par \par At least 15 minutes was spent during the visit on obesity counseling. \par \par f/u in 8 weeks\par  [Diabetes Foot Care] : diabetes foot care [Long Term Vascular Complications] : long term vascular complications of diabetes [Carbohydrate Consistent Diet] : carbohydrate consistent diet [Importance of Diet and Exercise] : importance of diet and exercise to improve glycemic control, achieve weight loss and improve cardiovascular health [Exercise/Effect on Glucose] : exercise/effect on glucose [Self Monitoring of Blood Glucose] : self monitoring of blood glucose [Injection Technique, Storage, Sharps Disposal] : injection technique, storage, and sharps disposal [Retinopathy Screening] : Patient was referred to ophthalmology for retinopathy screening [Weight Loss] : weight loss [Diabetic Medications] : Risks and benefits of diabetic medications were discussed [Levothyroxine] : The patient was instructed to take Levothyroxine on an empty stomach, separate from vitamins, and wait at least 30 minutes before eating

## 2022-05-22 NOTE — HISTORY OF PRESENT ILLNESS
[FreeTextEntry1] : Patient presents for follow up of hypothyroidism, IGT and obesity. Presently she is on Levothyroxine 50 mcg 6 days a week. She went for a thyroid sono, which showed multiple thyroid nodules. Largest nodule 1.8 cm was biopsied and is benign. With new subcm nodules now seen.\par She continues to follow a healthy lifestyle. She has resumed going back to the gym. She did lose 7 lbs since her last visit. She does have a h/o gestational diabetes and was on insulin.  A1C is 5 down from 5.6. She is taking Metformin 1000 mg BID and Wegovy 2.4mg weekly. Feels well on higher dose. 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 She also has sleep apnea.\par She is being followed by Hem/Onc. There does not appear to be any concern regarding her elevated WBCs.\par

## 2022-05-22 NOTE — PHYSICAL EXAM
[Alert] : alert [Well Nourished] : well nourished [No Acute Distress] : no acute distress [Well Developed] : well developed [Normal Sclera/Conjunctiva] : normal sclera/conjunctiva [EOMI] : extra ocular movement intact [No Proptosis] : no proptosis [Thyroid Not Enlarged] : the thyroid was not enlarged [No Thyroid Nodules] : no palpable thyroid nodules [No Respiratory Distress] : no respiratory distress [No Accessory Muscle Use] : no accessory muscle use [Clear to Auscultation] : lungs were clear to auscultation bilaterally [Normal S1, S2] : normal S1 and S2 [Normal Rate] : heart rate was normal [Regular Rhythm] : with a regular rhythm [No Edema] : no peripheral edema [Pedal Pulses Normal] : the pedal pulses are present [Normal Bowel Sounds] : normal bowel sounds [Not Tender] : non-tender [Not Distended] : not distended [Soft] : abdomen soft [Normal Anterior Cervical Nodes] : no anterior cervical lymphadenopathy [No Spinal Tenderness] : no spinal tenderness [Spine Straight] : spine straight [Normal Gait] : normal gait [Normal Strength/Tone] : muscle strength and tone were normal [No Rash] : no rash [Acanthosis Nigricans] : no acanthosis nigricans [Hirsutism] : hirsutism present [Normal] : normal [Full ROM] : with full range of motion [Diminished Throughout Both Feet] : normal tactile sensation with monofilament testing throughout both feet [Normal Reflexes] : deep tendon reflexes were 2+ and symmetric [No Tremors] : no tremors [Oriented x3] : oriented to person, place, and time

## 2022-08-09 ENCOUNTER — APPOINTMENT (OUTPATIENT)
Dept: BARIATRICS | Facility: CLINIC | Age: 42
End: 2022-08-09

## 2022-08-09 VITALS
HEIGHT: 58 IN | OXYGEN SATURATION: 100 % | SYSTOLIC BLOOD PRESSURE: 110 MMHG | BODY MASS INDEX: 36.97 KG/M2 | DIASTOLIC BLOOD PRESSURE: 53 MMHG | TEMPERATURE: 97.2 F | HEART RATE: 67 BPM | WEIGHT: 176.13 LBS

## 2022-08-09 PROCEDURE — G0447 BEHAVIOR COUNSEL OBESITY 15M: CPT | Mod: 59

## 2022-08-09 PROCEDURE — 99214 OFFICE O/P EST MOD 30 MIN: CPT

## 2022-08-09 PROCEDURE — 36415 COLL VENOUS BLD VENIPUNCTURE: CPT

## 2022-08-09 RX ORDER — SEMAGLUTIDE 2.68 MG/ML
8 INJECTION, SOLUTION SUBCUTANEOUS
Qty: 3 | Refills: 1 | Status: DISCONTINUED | COMMUNITY
Start: 2022-05-22 | End: 2022-08-09

## 2022-08-09 RX ORDER — SEMAGLUTIDE 2.4 MG/.75ML
2.4 INJECTION, SOLUTION SUBCUTANEOUS
Qty: 3 | Refills: 2 | Status: DISCONTINUED | COMMUNITY
Start: 2021-07-21 | End: 2022-08-09

## 2022-08-14 NOTE — ASSESSMENT
[FreeTextEntry1] : 1. IGT/Obesity - Continue Metformin and Wegovy. Will try to see if we can get Mounjaro covered since it will help with IGT and Weight loss. She met with the RD today. Continue to focus on lifestyle modifications. Diet discussed again. We discussed ways she can incorporate exercise into her daily routine. We discussed the importance of weight loss in the management of her comorbidities. We discussed the risks of continued excess weight on long term health. \par \par Recommendations: \par Breakfast: Overnight oats with fresh berries, nuts\par Snacks: carrots, apples and peanut butter\par Lunch: wrap with deli meat, lettuce, tomato\par Dinner: Homecooked meals, or balanced snack plate with cheese and crackers, fruits and veggies \par Exercise: going to the gym or go for a walk with kids\par \par 2. Hypothyroidism/Thyroid nodule - She will continue Levothyroxine 50 mcg 6 days. Will repeat thyroid sono in 9/2022.\par 3. Elevated WBC/Lymph - f/u with HemOnc\par 4. Vit D - She started Vit D 2000 iu daily. Continue to take. \par \par At least 15 minutes was spent during the visit on obesity counseling. \par \par Fasting labs done today in the office, will call with results. \par \par f/u with RD in 1 month

## 2022-08-14 NOTE — HISTORY OF PRESENT ILLNESS
[FreeTextEntry1] : Patient presents for follow up of hypothyroidism, IGT and obesity. Since last visit she has gained 7 lbs. She has not been following a healthy lifestyle. Her diet has been higher in carbs. She has not been going to the gym since there are a lot of people there.  She is taking Metformin 1000 mg BID and Wegovy 2.4mg weekly. Feels well on higher dose. Reports ins does not want to pay for Wegovy any longer and she has only 2 injections left. She has lost about 30 lbs. She does have a h/o gestational diabetes and was on insulin. 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 She went for a thyroid sono, which showed multiple thyroid nodules. Largest nodule 1.8 cm was biopsied and is benign. With new subcm nodules now seen.\par She is being followed by Hem/Onc. There does not appear to be any concern regarding her elevated WBCs.\par \par 24 hr recall:\par Breakfast: Iced tea with sugar\par Lunch: Pizza and a cookie\par Dinner: Cereal with soy milk \par \par

## 2022-08-16 LAB
25(OH)D3 SERPL-MCNC: 23.6 NG/ML
ALBUMIN SERPL ELPH-MCNC: 4.3 G/DL
ALP BLD-CCNC: 57 U/L
ALT SERPL-CCNC: 10 U/L
ANION GAP SERPL CALC-SCNC: 10 MMOL/L
AST SERPL-CCNC: 15 U/L
BASOPHILS # BLD AUTO: 0.03 K/UL
BASOPHILS NFR BLD AUTO: 0.3 %
BILIRUB SERPL-MCNC: 0.3 MG/DL
BUN SERPL-MCNC: 8 MG/DL
CALCIUM SERPL-MCNC: 9.6 MG/DL
CHLORIDE SERPL-SCNC: 104 MMOL/L
CHOLEST SERPL-MCNC: 194 MG/DL
CO2 SERPL-SCNC: 24 MMOL/L
CREAT SERPL-MCNC: 0.66 MG/DL
EGFR: 112 ML/MIN/1.73M2
EOSINOPHIL # BLD AUTO: 0.16 K/UL
EOSINOPHIL NFR BLD AUTO: 1.6 %
ESTIMATED AVERAGE GLUCOSE: 117 MG/DL
FERRITIN SERPL-MCNC: 12 NG/ML
FOLATE SERPL-MCNC: 11.5 NG/ML
GLUCOSE SERPL-MCNC: 95 MG/DL
HBA1C MFR BLD HPLC: 5.7 %
HCT VFR BLD CALC: 38.6 %
HDLC SERPL-MCNC: 40 MG/DL
HGB BLD-MCNC: 12 G/DL
IMM GRANULOCYTES NFR BLD AUTO: 0.3 %
IRON SATN MFR SERPL: 10 %
IRON SERPL-MCNC: 42 UG/DL
LDLC SERPL CALC-MCNC: 119 MG/DL
LYMPHOCYTES # BLD AUTO: 3.55 K/UL
LYMPHOCYTES NFR BLD AUTO: 34.6 %
MAN DIFF?: NORMAL
MCHC RBC-ENTMCNC: 26.6 PG
MCHC RBC-ENTMCNC: 31.1 GM/DL
MCV RBC AUTO: 85.6 FL
MONOCYTES # BLD AUTO: 0.63 K/UL
MONOCYTES NFR BLD AUTO: 6.1 %
NEUTROPHILS # BLD AUTO: 5.85 K/UL
NEUTROPHILS NFR BLD AUTO: 57.1 %
NONHDLC SERPL-MCNC: 154 MG/DL
PLATELET # BLD AUTO: 366 K/UL
POTASSIUM SERPL-SCNC: 4.2 MMOL/L
PROT SERPL-MCNC: 7.2 G/DL
RBC # BLD: 4.51 M/UL
RBC # FLD: 14.3 %
SODIUM SERPL-SCNC: 138 MMOL/L
T4 FREE SERPL-MCNC: 1 NG/DL
T4 SERPL-MCNC: 7.6 UG/DL
TIBC SERPL-MCNC: 405 UG/DL
TRIGL SERPL-MCNC: 176 MG/DL
TSH SERPL-ACNC: 1.81 UIU/ML
UIBC SERPL-MCNC: 363 UG/DL
VIT B12 SERPL-MCNC: 227 PG/ML
WBC # FLD AUTO: 10.25 K/UL

## 2022-09-15 ENCOUNTER — APPOINTMENT (OUTPATIENT)
Dept: BARIATRICS | Facility: CLINIC | Age: 42
End: 2022-09-15

## 2022-09-15 VITALS
SYSTOLIC BLOOD PRESSURE: 105 MMHG | DIASTOLIC BLOOD PRESSURE: 69 MMHG | OXYGEN SATURATION: 97 % | BODY MASS INDEX: 36.94 KG/M2 | WEIGHT: 176 LBS | HEART RATE: 62 BPM | TEMPERATURE: 98.2 F | HEIGHT: 58 IN

## 2022-09-15 PROCEDURE — 99214 OFFICE O/P EST MOD 30 MIN: CPT

## 2022-09-29 NOTE — HISTORY OF PRESENT ILLNESS
[FreeTextEntry1] : Patient presents for follow up of hypothyroidism, IGT and obesity. Her weight is stable since last visit. She has not been able to exercise but has been trying to follow a lower carb diet. She is taking Metformin 1000 mg BID. Since last visit she has been taking Mounjaro 2.5 mg and titrated to 5 mg (last week first injection). Feels well and denies any GI side effects. She has lost about 30 lbs. She does have a h/o gestational diabetes and was on insulin. 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 She went for a thyroid sono, which showed multiple thyroid nodules. Largest nodule 1.8 cm was biopsied and is benign. With new subcm nodules now seen.\par She is being followed by Hem/Onc. There does not appear to be any concern regarding her elevated WBCs.\par \par \par \par

## 2022-09-29 NOTE — ASSESSMENT
[FreeTextEntry1] : 1. IGT/Obesity - Continue Metformin and Mounjaro titrate to 7.5 mg weekly. Continue to focus on lifestyle modifications. Diet discussed again. We discussed ways she can incorporate exercise into her daily routine. We discussed the importance of weight loss in the management of her comorbidities. We discussed the risks of continued excess weight on long term health.  \par 3. Elevated WBC/Lymph - continue f/u with HemOnc\par 4. Vit D - She started Vit D 2000 iu daily. Continue to take. \par \par f/u in 8 weeks\par

## 2022-10-06 ENCOUNTER — NON-APPOINTMENT (OUTPATIENT)
Age: 42
End: 2022-10-06

## 2022-10-08 ENCOUNTER — RX RENEWAL (OUTPATIENT)
Age: 42
End: 2022-10-08

## 2022-10-08 RX ORDER — TIRZEPATIDE 7.5 MG/.5ML
7.5 INJECTION, SOLUTION SUBCUTANEOUS
Qty: 2 | Refills: 0 | Status: ACTIVE | COMMUNITY
Start: 2022-08-09 | End: 1900-01-01

## 2022-12-12 ENCOUNTER — OUTPATIENT (OUTPATIENT)
Dept: OUTPATIENT SERVICES | Facility: HOSPITAL | Age: 42
LOS: 1 days | Discharge: HOME | End: 2022-12-12

## 2022-12-12 DIAGNOSIS — D69.6 THROMBOCYTOPENIA, UNSPECIFIED: ICD-10-CM

## 2023-01-13 ENCOUNTER — APPOINTMENT (OUTPATIENT)
Dept: ORTHOPEDIC SURGERY | Facility: CLINIC | Age: 43
End: 2023-01-13
Payer: COMMERCIAL

## 2023-01-13 VITALS — BODY MASS INDEX: 34.36 KG/M2 | WEIGHT: 175 LBS | HEIGHT: 60 IN

## 2023-01-13 DIAGNOSIS — S46.911A STRAIN OF UNSPECIFIED MUSCLE, FASCIA AND TENDON AT SHOULDER AND UPPER ARM LEVEL, RIGHT ARM, INITIAL ENCOUNTER: ICD-10-CM

## 2023-01-13 PROCEDURE — 99214 OFFICE O/P EST MOD 30 MIN: CPT

## 2023-01-13 PROCEDURE — 73010 X-RAY EXAM OF SHOULDER BLADE: CPT | Mod: RT

## 2023-01-13 PROCEDURE — 73030 X-RAY EXAM OF SHOULDER: CPT | Mod: RT

## 2023-01-13 PROCEDURE — 99204 OFFICE O/P NEW MOD 45 MIN: CPT

## 2023-01-13 RX ORDER — METHYLPREDNISOLONE 4 MG/1
4 TABLET ORAL
Qty: 1 | Refills: 0 | Status: ACTIVE | COMMUNITY
Start: 2023-01-13 | End: 1900-01-01
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
## 2023-01-13 NOTE — ASSESSMENT
[FreeTextEntry1] : We reviewed her history.\par Her questions were answered.\par She is on Metformin for weight loss - there is no DM.\par MDP planned.\par PT advised.\par Should her sx persist, an MRI could be considered.\par recommended she will see her prior PCP treating the neck.\par \par Patient seen by Dr. Bhupinder Patricio.\par Patient seen by Edwige BRINK under the supervision of Dr. Bhupinder Patricio.\par Progress note completed by Edwige BRINK.\par Entered by Edwige BRINK acting as a scribe for Dr. Bhupinder Patricio.

## 2023-01-13 NOTE — IMAGING
[Right] : right shoulder [FreeTextEntry1] : The joints are OK. [FreeTextEntry5] : There is a Type I acromion.

## 2023-01-13 NOTE — PHYSICAL EXAM
[Right] : right shoulder [2] : right 2nd digit [3] : right 3rd digit [4] : right 4th digit [Sitting] : sitting [Moderate] : moderate [Mild] : mild [4 ___] : forward flexion 4[unfilled]/5 [5___] : external rotation 5[unfilled]/5 [Left] : left shoulder [] : no sensory deficits [de-identified] : -bellypress [FreeTextEntry9] : IR: T8 [TWNoteComboBox6] : internal rotation L1 [TWNoteComboBox4] : passive forward flexion 175 degrees [de-identified] : external rotation 75 degrees

## 2023-01-30 ENCOUNTER — APPOINTMENT (OUTPATIENT)
Dept: ORTHOPEDIC SURGERY | Facility: CLINIC | Age: 43
End: 2023-01-30
Payer: COMMERCIAL

## 2023-01-30 VITALS — WEIGHT: 170 LBS | HEIGHT: 59 IN | BODY MASS INDEX: 34.27 KG/M2

## 2023-01-30 DIAGNOSIS — Z86.39 PERSONAL HISTORY OF OTHER ENDOCRINE, NUTRITIONAL AND METABOLIC DISEASE: ICD-10-CM

## 2023-01-30 PROCEDURE — 99214 OFFICE O/P EST MOD 30 MIN: CPT

## 2023-01-30 RX ORDER — MELOXICAM 15 MG/1
15 TABLET ORAL
Qty: 30 | Refills: 0 | Status: ACTIVE | COMMUNITY
Start: 2023-01-30 | End: 1900-01-01

## 2023-01-30 NOTE — DISCUSSION/SUMMARY
[Medication Risks Reviewed] : Medication risks reviewed [de-identified] : General Discussion\par The patient was advised of the diagnosis.  The natural history of the pathology was explained in full to the patient in layman's terms. All questions were answered.  The risks and benefits of surgical and non-surgical treatment alternatives were explained in full to the patient\par \par MRI R shoulder r/o RTC tear AC joint injury \par Rx for Meloxicam \par Icing \par \par Instructions: Entered by Helen RENO acting as scribe.\par \par Dr. Camacho -\par The documentation recorded by the scribe accurately reflects the service I personally performed and the decisions made by me.\par
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
## 2023-01-30 NOTE — PHYSICAL EXAM
[Right] : right shoulder [Standing] : standing [Moderate] : moderate [] : no erythema [5 ___] : forward flexion 5[unfilled]/5 [5___] : internal rotation 5[unfilled]/5 [TWNoteComboBox7] : active forward flexion 150 degrees [de-identified] : active abduction 100 degrees [TWNoteComboBox6] : internal rotation L3 [de-identified] : external rotation 55 degrees Group Therapy/Milieu Therapy/Family Education

## 2023-01-31 ENCOUNTER — APPOINTMENT (OUTPATIENT)
Dept: HEMATOLOGY ONCOLOGY | Facility: CLINIC | Age: 43
End: 2023-01-31
Payer: COMMERCIAL

## 2023-01-31 ENCOUNTER — RESULT REVIEW (OUTPATIENT)
Age: 43
End: 2023-01-31

## 2023-01-31 ENCOUNTER — APPOINTMENT (OUTPATIENT)
Dept: HEMATOLOGY ONCOLOGY | Facility: CLINIC | Age: 43
End: 2023-01-31

## 2023-01-31 VITALS
BODY MASS INDEX: 36.8 KG/M2 | HEART RATE: 60 BPM | TEMPERATURE: 97.5 F | HEIGHT: 59 IN | OXYGEN SATURATION: 100 % | SYSTOLIC BLOOD PRESSURE: 112 MMHG | RESPIRATION RATE: 16 BRPM | WEIGHT: 182.52 LBS | DIASTOLIC BLOOD PRESSURE: 75 MMHG

## 2023-01-31 LAB
BASOPHILS # BLD AUTO: 0.05 K/UL — SIGNIFICANT CHANGE UP (ref 0–0.2)
BASOPHILS NFR BLD AUTO: 0.4 % — SIGNIFICANT CHANGE UP (ref 0–2)
EOSINOPHIL # BLD AUTO: 0.11 K/UL — SIGNIFICANT CHANGE UP (ref 0–0.5)
EOSINOPHIL NFR BLD AUTO: 1 % — SIGNIFICANT CHANGE UP (ref 0–6)
HCT VFR BLD CALC: 35.6 % — SIGNIFICANT CHANGE UP (ref 34.5–45)
HGB BLD-MCNC: 11.2 G/DL — LOW (ref 11.5–15.5)
IMM GRANULOCYTES NFR BLD AUTO: 0.6 % — SIGNIFICANT CHANGE UP (ref 0–0.9)
LYMPHOCYTES # BLD AUTO: 3.72 K/UL — HIGH (ref 1–3.3)
LYMPHOCYTES # BLD AUTO: 33 % — SIGNIFICANT CHANGE UP (ref 13–44)
MCHC RBC-ENTMCNC: 24.1 PG — LOW (ref 27–34)
MCHC RBC-ENTMCNC: 31.5 G/DL — LOW (ref 32–36)
MCV RBC AUTO: 76.6 FL — LOW (ref 80–100)
MONOCYTES # BLD AUTO: 0.84 K/UL — SIGNIFICANT CHANGE UP (ref 0–0.9)
MONOCYTES NFR BLD AUTO: 7.5 % — SIGNIFICANT CHANGE UP (ref 2–14)
NEUTROPHILS # BLD AUTO: 6.48 K/UL — SIGNIFICANT CHANGE UP (ref 1.8–7.4)
NEUTROPHILS NFR BLD AUTO: 57.5 % — SIGNIFICANT CHANGE UP (ref 43–77)
NRBC # BLD: 0 /100 WBCS — SIGNIFICANT CHANGE UP (ref 0–0)
PLATELET # BLD AUTO: 311 K/UL — SIGNIFICANT CHANGE UP (ref 150–400)
RBC # BLD: 4.65 M/UL — SIGNIFICANT CHANGE UP (ref 3.8–5.2)
RBC # FLD: 15.4 % — HIGH (ref 10.3–14.5)
WBC # BLD: 11.27 K/UL — HIGH (ref 3.8–10.5)
WBC # FLD AUTO: 11.27 K/UL — HIGH (ref 3.8–10.5)

## 2023-01-31 PROCEDURE — 99214 OFFICE O/P EST MOD 30 MIN: CPT

## 2023-01-31 RX ORDER — FERROUS GLUCONATE 324(38)MG
324 (38 FE) TABLET ORAL DAILY
Qty: 90 | Refills: 1 | Status: ACTIVE | COMMUNITY
Start: 2023-01-31

## 2023-02-05 NOTE — REVIEW OF SYSTEMS
[Negative] : Heme/Lymph [Fever] : no fever [Chills] : no chills [Eye Pain] : no eye pain [Dysphagia] : no dysphagia [Chest Pain] : no chest pain [Shortness Of Breath] : no shortness of breath [Abdominal Pain] : no abdominal pain [Dysuria] : no dysuria [Joint Pain] : no joint pain [Skin Rash] : no skin rash [Confused] : no confusion

## 2023-02-05 NOTE — ASSESSMENT
[FreeTextEntry1] : 42 year old with what is now a mild iron deficiency.   Hgb 11.2, MCV 76.6, 24.1, MCHC 31.5, patient asymptomatic, to continue current regimen of iron supplementation with ferrous gluconate 38mg daily.  Patient can follow up with her PCP and recheck the Hg, if Hg < 11g/dl can return for routine for consideration of restarting the ferraheme.

## 2023-02-05 NOTE — HISTORY OF PRESENT ILLNESS
[de-identified] : This is a 42 year old woman with a history of iron deficiency anemia.  No history of PCOS.  Patient was evaluated for this but had an ovarian cyst, not multiple.  WBC 11.27, platelets 311 today.  Hg 11.2g/dl today.  Patient still unable to tolerate Iron infusions. [de-identified] : Donna is taking the Slofe iron.  Taking it once a week.  \par 42F returning for follow up for iron deficiency anema. Patient has not had any complaints, denies fatigue, weight loss, fevers, chills, or night sweats.

## 2023-02-07 ENCOUNTER — APPOINTMENT (OUTPATIENT)
Dept: MRI IMAGING | Facility: CLINIC | Age: 43
End: 2023-02-07

## 2023-02-08 ENCOUNTER — RESULT REVIEW (OUTPATIENT)
Age: 43
End: 2023-02-08

## 2023-02-13 ENCOUNTER — APPOINTMENT (OUTPATIENT)
Dept: ORTHOPEDIC SURGERY | Facility: CLINIC | Age: 43
End: 2023-02-13
Payer: OTHER MISCELLANEOUS

## 2023-02-13 ENCOUNTER — APPOINTMENT (OUTPATIENT)
Dept: ORTHOPEDIC SURGERY | Facility: CLINIC | Age: 43
End: 2023-02-13

## 2023-02-13 VITALS — BODY MASS INDEX: 36.69 KG/M2 | WEIGHT: 182 LBS | HEIGHT: 59 IN

## 2023-02-13 PROCEDURE — 99072 ADDL SUPL MATRL&STAF TM PHE: CPT

## 2023-02-13 PROCEDURE — 99214 OFFICE O/P EST MOD 30 MIN: CPT

## 2023-02-13 NOTE — PHYSICAL EXAM
[Right] : right shoulder [Standing] : standing [Moderate] : moderate [5 ___] : forward flexion 5[unfilled]/5 [5___] : internal rotation 5[unfilled]/5 [] : no erythema [TWNoteComboBox7] : active forward flexion 140 degrees [de-identified] : active abduction 95 degrees [TWNoteComboBox6] : internal rotation L3 [de-identified] : external rotation 55 degrees

## 2023-02-13 NOTE — WORK
[Sprain/Strain] : sprain/strain [Torn Ligament/Tendon/Muscle] : torn ligament, tendon or muscle [Was the competent medical cause of the injury] : was the competent medical cause of the injury [Are consistent with the injury] : are consistent with the injury [Consistent with my objective findings] : consistent with my objective findings [Partial] : partial [Does not reveal pre-existing condition(s) that may affect treatment/prognosis] : does not reveal pre-existing condition(s) that may affect treatment/prognosis [Patient] : patient [No Rx restrictions] : No Rx restrictions. [I provided the services listed above] :  I provided the services listed above. [FreeTextEntry2] : pt working

## 2023-02-13 NOTE — DISCUSSION/SUMMARY
[de-identified] : General Dx Discussion\par The patient was advised of the diagnosis. The natural history of the pathology was explained in full to the patient in layman's terms. All questions were answered. The risks and benefits of surgical and non-surgical treatment alternatives were explained in full to the patient.\par \par mri reviewed \par surg/non surg options discussed will try PT poss of surgery also discussed \par pt advised to limit lifting \par she understands and agrees

## 2023-02-13 NOTE — DATA REVIEWED
[FreeTextEntry1] : MRI R shoulder ([facility]) 2/8/23:\par Tendinosis and low-grade partial-thickness undersurface tearing of the supraspinatus tendon.\par \par Superior labral tear extending into the anterior superior and posterior superior labrum.\par \par Degenerative changes at the acromioclavicular joint.

## 2023-02-13 NOTE — HISTORY OF PRESENT ILLNESS
[8] : 8 [2] : 2 [Dull/Aching] : dull/aching [Sharp] : sharp [] : yes [Leisure] : leisure [Work] : work [Rest] : rest [Full time] : Work status: full time [de-identified] : Patient is here for MRI results of her right shoulder. [FreeTextEntry1] : Right shoulder [FreeTextEntry7] : sometimes to the elbow [FreeTextEntry9] : Advil [de-identified] : driving [de-identified] : none

## 2023-02-13 NOTE — PHYSICAL EXAM
[Right] : right shoulder [Standing] : standing [Moderate] : moderate [5 ___] : forward flexion 5[unfilled]/5 [5___] : internal rotation 5[unfilled]/5 [] : motor and sensory intact distally [TWNoteComboBox7] : active forward flexion 145 degrees [de-identified] : active abduction 95 degrees [TWNoteComboBox6] : internal rotation L3 [de-identified] : external rotation 55 degrees

## 2023-02-13 NOTE — DISCUSSION/SUMMARY
[Medication Risks Reviewed] : Medication risks reviewed [de-identified] : General Discussion\par The patient was advised of the diagnosis.  The natural history of the pathology was explained in full to the patient in layman's terms. All questions were answered.  The risks and benefits of surgical and non-surgical treatment alternatives were explained in full to the patient\par \par mri reviewed will get PT \par surg/non surg options discussed poss of surgeyr discussed will avoid lifting \par pt understands and agrees questions answered

## 2023-02-13 NOTE — WORK
[Partial] : partial [Does not reveal pre-existing condition(s) that may affect treatment/prognosis] : does not reveal pre-existing condition(s) that may affect treatment/prognosis [N/A] : : Not Applicable [Patient] : patient [No Rx restrictions] : No Rx restrictions. [I provided the services listed above] :  I provided the services listed above. [FreeTextEntry2] : pt is working

## 2023-02-13 NOTE — DATA REVIEWED
[MRI] : MRI [Right] : of the right [Shoulder] : shoulder [Report was reviewed and noted in the chart] : The report was reviewed and noted in the chart [FreeTextEntry1] : partial RCT

## 2023-03-13 ENCOUNTER — APPOINTMENT (OUTPATIENT)
Dept: ORTHOPEDIC SURGERY | Facility: CLINIC | Age: 43
End: 2023-03-13

## 2023-03-27 ENCOUNTER — APPOINTMENT (OUTPATIENT)
Dept: ORTHOPEDIC SURGERY | Facility: CLINIC | Age: 43
End: 2023-03-27
Payer: OTHER MISCELLANEOUS

## 2023-03-27 VITALS — WEIGHT: 182 LBS | BODY MASS INDEX: 36.69 KG/M2 | HEIGHT: 59 IN

## 2023-03-27 PROCEDURE — 99214 OFFICE O/P EST MOD 30 MIN: CPT

## 2023-03-27 NOTE — PHYSICAL EXAM
[Right] : right shoulder [Standing] : standing [Moderate] : moderate [5 ___] : forward flexion 5[unfilled]/5 [5___] : internal rotation 5[unfilled]/5 [] : motor and sensory intact distally [TWNoteComboBox7] : active forward flexion 160 degrees [de-identified] : active abduction 110 degrees [TWNoteComboBox6] : internal rotation L3 [de-identified] : external rotation 65 degrees

## 2023-03-27 NOTE — WORK
[Partial] : partial [Does not reveal pre-existing condition(s) that may affect treatment/prognosis] : does not reveal pre-existing condition(s) that may affect treatment/prognosis [N/A] : : Not Applicable [Patient] : patient [No Rx restrictions] : No Rx restrictions. [I provided the services listed above] :  I provided the services listed above. [FreeTextEntry2] : pt working

## 2023-03-27 NOTE — DISCUSSION/SUMMARY
[Medication Risks Reviewed] : Medication risks reviewed [de-identified] : General Discussion\par The patient was advised of the diagnosis.  The natural history of the pathology was explained in full to the patient in layman's terms. All questions were answered.  The risks and benefits of surgical and non-surgical treatment alternatives were explained in full to the patient\par \par PT has helped will cont PT f/u 4-5 weeks questions answered

## 2023-03-27 NOTE — REASON FOR VISIT
[FreeTextEntry2] : follow up right shoulder WC DOI 1/1/23\par pt reports less muscle pain, but more shoulder pain \par has been going to PT \par

## 2023-05-08 ENCOUNTER — APPOINTMENT (OUTPATIENT)
Dept: ORTHOPEDIC SURGERY | Facility: CLINIC | Age: 43
End: 2023-05-08
Payer: OTHER MISCELLANEOUS

## 2023-05-08 VITALS — HEIGHT: 59 IN | WEIGHT: 172 LBS | BODY MASS INDEX: 34.68 KG/M2

## 2023-05-08 PROCEDURE — 99214 OFFICE O/P EST MOD 30 MIN: CPT | Mod: 25

## 2023-05-08 PROCEDURE — J3490M: CUSTOM

## 2023-05-08 PROCEDURE — 20611 DRAIN/INJ JOINT/BURSA W/US: CPT

## 2023-05-08 NOTE — PROCEDURE
[Large Joint Injection] : Large joint injection [Right] : of the right [Subacromial Space] : subacromial space [Pain] : pain [Inflammation] : inflammation [X-ray evidence of Osteoarthritis on this or prior visit] : x-ray evidence of Osteoarthritis on this or prior visit [Alcohol] : alcohol [Betadine] : betadine [Ethyl Chloride sprayed topically] : ethyl chloride sprayed topically [Sterile technique used] : sterile technique used [___ cc    3mg] :  Betamethasone (Celestone) ~Vcc of 3mg [___ cc    1%] : Lidocaine ~Vcc of 1%  [___ cc    0.25%] : Bupivacaine (Marcaine) ~Vcc of 0.25%  [] : Patient tolerated procedure well [Call if redness, pain or fever occur] : call if redness, pain or fever occur [Apply ice for 15min out of every hour for the next 12-24 hours as tolerated] : apply ice for 15 minutes out of every hour for the next 12-24 hours as tolerated [Previous OTC use and PT nontherapeutic] : patient has tried OTC's including aspirin, Ibuprofen, Aleve, etc or prescription NSAIDS, and/or exercises at home and/or physical therapy without satisfactory response [Patient had decreased mobility in the joint] : patient had decreased mobility in the joint [Risks, benefits, alternatives discussed / Verbal consent obtained] : the risks benefits, and alternatives have been discussed, and verbal consent was obtained [Prior failure or difficult injection] : prior failure or difficult injection [All ultrasound images have been permanently captured and stored accordingly in our picture archiving and communication system] : All ultrasound images have been permanently captured and stored accordingly in our picture archiving and communication system [Visualization of the needle and placement of injection was performed without complication] : visualization of the needle and placement of injection was performed without complication

## 2023-05-08 NOTE — DISCUSSION/SUMMARY
[Medication Risks Reviewed] : Medication risks reviewed [de-identified] : General Discussion\par The patient was advised of the diagnosis.  The natural history of the pathology was explained in full to the patient in layman's terms. All questions were answered.  The risks and benefits of surgical and non-surgical treatment alternatives were explained in full to the patient\par \par will try a injection \par surg/ non surg options discussed \par questions answered

## 2023-05-08 NOTE — PHYSICAL EXAM
[Right] : right shoulder [Standing] : standing [Moderate] : moderate [5 ___] : forward flexion 5[unfilled]/5 [5___] : internal rotation 5[unfilled]/5 [] : no erythema [TWNoteComboBox7] : active forward flexion 150 degrees [de-identified] : active abduction 110 degrees [TWNoteComboBox6] : internal rotation L3 [de-identified] : external rotation 65 degrees

## 2023-05-08 NOTE — HISTORY OF PRESENT ILLNESS
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

## 2023-05-08 NOTE — REASON FOR VISIT
[FreeTextEntry2] : follow up right shoulder\par   DOI 1/1/23\par still reports a sharp pain, has pain at night \par has been to PT \par pt reports less muscle pain, but more shoulder pain \par has been going to PT \par

## 2023-06-05 ENCOUNTER — APPOINTMENT (OUTPATIENT)
Dept: ORTHOPEDIC SURGERY | Facility: CLINIC | Age: 43
End: 2023-06-05
Payer: OTHER MISCELLANEOUS

## 2023-06-05 VITALS — HEIGHT: 59 IN | WEIGHT: 172 LBS | BODY MASS INDEX: 34.68 KG/M2

## 2023-06-05 PROCEDURE — 99214 OFFICE O/P EST MOD 30 MIN: CPT

## 2023-06-05 NOTE — DISCUSSION/SUMMARY
[Medication Risks Reviewed] : Medication risks reviewed [de-identified] : General Discussion\par The patient was advised of the diagnosis.  The natural history of the pathology was explained in full to the patient in layman's terms. All questions were answered.  The risks and benefits of surgical and non-surgical treatment alternatives were explained in full to the patient\par \par will get a new mri rt shoulder since reports it locked f/u after mri
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
## 2023-06-05 NOTE — PHYSICAL EXAM
[Right] : right shoulder [Standing] : standing [Moderate] : moderate [5 ___] : forward flexion 5[unfilled]/5 [5___] : internal rotation 5[unfilled]/5 [] : motor and sensory intact distally [TWNoteComboBox7] : active forward flexion 150 degrees [de-identified] : active abduction 130 degrees [TWNoteComboBox6] : internal rotation L3 [de-identified] : external rotation 65 degrees

## 2023-06-07 ENCOUNTER — RX RENEWAL (OUTPATIENT)
Age: 43
End: 2023-06-07

## 2023-06-28 ENCOUNTER — RESULT REVIEW (OUTPATIENT)
Age: 43
End: 2023-06-28

## 2023-06-30 ENCOUNTER — APPOINTMENT (OUTPATIENT)
Dept: ORTHOPEDIC SURGERY | Facility: CLINIC | Age: 43
End: 2023-06-30
Payer: OTHER MISCELLANEOUS

## 2023-06-30 VITALS — BODY MASS INDEX: 34.68 KG/M2 | HEIGHT: 59 IN | WEIGHT: 172 LBS

## 2023-06-30 PROCEDURE — 99214 OFFICE O/P EST MOD 30 MIN: CPT

## 2023-06-30 NOTE — DISCUSSION/SUMMARY
[Medication Risks Reviewed] : Medication risks reviewed [de-identified] : General Discussion\par The patient was advised of the diagnosis.  The natural history of the pathology was explained in full to the patient in layman's terms. All questions were answered.  The risks and benefits of surgical and non-surgical treatment alternatives were explained in full to the patient\par \par Case Discussed. Pt has failed PT, injection and medication. Has cont pain and weakness. \par MRI reviewed, has with pain and loss of motion \par Arthroscopy with decompression, debridement RCR discussed\par Risks, benefits and alternatives discussed. Risks include, but are not limited to infection, blood clot, nerve damage, recurrent tear, loss of motion, continued pain, worsened pain, need for another surgery in the future. Recurrent tear discussed \par Discussed that the surgery will not address any pain that she has from any OA she may have. She understands she will not be 100% better after surgery. Prolonged immobilization and rehabilitation discussed. Work limitations discussed.\par Questions answered.\par She expressed understanding and would like to proceed.\par \par The patient was advised of the diagnosis.  The natural history of the pathology was explained in full to the patient in layman's terms. All questions were answered.  The risks and benefits of surgical and non-surgical treatment alternatives were explained in full to the patient.  \par The patient demonstrated a full understanding of the surgical and non-surgical options.  The risks of surgery were outlined in full to the patient including but not limited to bleeding, scarring, infection, sepsis, neurologic injury, vascular injury, failure to resolve symptoms, symptom recurrence, the need for further surgery, non-healing, wound breakdown, deep vein thrombosis, pulmonary embolism, spontaneous osteonecrosis, anesthesia complications and even death.  The patient understood all the risks and accepted them and understood that other complications could occur that are not mentioned above.  The intraoperative plan, post-operative plan, post-operative expectations and limitations were explained in full.  Expectations from non-surgical treatment were explained in full as well.  The patient demonstrated a complete understanding of the treatment alternatives and requested the above-mentioned procedure.  This will be scheduled accordingly.\par
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
## 2023-06-30 NOTE — REASON FOR VISIT
[FreeTextEntry2] : WC follow up-right shoulder\par MRA review \par reports her shoudler locked \par has weakness to  now , seeing a neurologist for this

## 2023-06-30 NOTE — PHYSICAL EXAM
[Right] : right shoulder [Standing] : standing [Moderate] : moderate [5 ___] : forward flexion 5[unfilled]/5 [5___] : internal rotation 5[unfilled]/5 [] : motor and sensory intact distally [TWNoteComboBox7] : active forward flexion 160 degrees [de-identified] : active abduction 130 degrees [TWNoteComboBox6] : internal rotation L3 [de-identified] : external rotation 65 degrees

## 2023-06-30 NOTE — DATA REVIEWED
[MRI] : MRI [Right] : of the right [Report was reviewed and noted in the chart] : The report was reviewed and noted in the chart [I reviewed the films/CD] : I reviewed the films/CD [FreeTextEntry1] : partial RCT/ labral tear

## 2023-07-19 ENCOUNTER — FORM ENCOUNTER (OUTPATIENT)
Age: 43
End: 2023-07-19

## 2023-08-18 ENCOUNTER — APPOINTMENT (OUTPATIENT)
Dept: ORTHOPEDIC SURGERY | Facility: CLINIC | Age: 43
End: 2023-08-18
Payer: OTHER MISCELLANEOUS

## 2023-08-18 VITALS — BODY MASS INDEX: 36.29 KG/M2 | HEIGHT: 59 IN | WEIGHT: 180 LBS

## 2023-08-18 PROCEDURE — 99214 OFFICE O/P EST MOD 30 MIN: CPT

## 2023-08-18 NOTE — ASSESSMENT
[FreeTextEntry1] : will continue with the therapy new rx provided will get the emg and f/u with neuro   again requesting right shoulder arthroscopy approval from compensation f/u 6 weeks

## 2023-08-18 NOTE — PHYSICAL EXAM
[Right] : right shoulder [Standing] : standing [Moderate] : moderate [5 ___] : forward flexion 5[unfilled]/5 [5___] : internal rotation 5[unfilled]/5 [] : negative drop-arm test [TWNoteComboBox7] : active forward flexion 160 degrees [de-identified] : active abduction 130 degrees [TWNoteComboBox6] : internal rotation L3 [de-identified] : external rotation 65 degrees

## 2023-08-18 NOTE — DISCUSSION/SUMMARY
[Medication Risks Reviewed] : Medication risks reviewed [de-identified] : General Discussion The patient was advised of the diagnosis.  The natural history of the pathology was explained in full to the patient in layman's terms. All questions were answered.  The risks and benefits of surgical and non-surgical treatment alternatives were explained in full to the patient  Case Discussed will cont PT waiting for auth for surgery

## 2023-08-18 NOTE — HISTORY OF PRESENT ILLNESS
[Work related] : work related [6] : 6 [4] : 4 [Burning] : burning [Sharp] : sharp [Throbbing] : throbbing [Constant] : constant [Household chores] : household chores [Leisure] : leisure [Work] : work [Sleep] : sleep [Meds] : meds [Ice] : ice [Heat] : heat [Part time] : Work status: part time [] : yes [de-identified] : 8-18-23- since the last visit she continues with pain and dysfunction to the right shoulder. 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  [FreeTextEntry6] : numbness in her hand [FreeTextEntry7] : just past her elbow [de-identified] : activity [de-identified] : Physical therapy 2 x a week, [facility].

## 2023-09-29 ENCOUNTER — APPOINTMENT (OUTPATIENT)
Dept: ORTHOPEDIC SURGERY | Facility: CLINIC | Age: 43
End: 2023-09-29
Payer: OTHER MISCELLANEOUS

## 2023-09-29 VITALS — BODY MASS INDEX: 35.28 KG/M2 | HEIGHT: 59 IN | WEIGHT: 175 LBS

## 2023-09-29 DIAGNOSIS — M25.611 STIFFNESS OF RIGHT SHOULDER, NOT ELSEWHERE CLASSIFIED: ICD-10-CM

## 2023-09-29 PROCEDURE — 99214 OFFICE O/P EST MOD 30 MIN: CPT

## 2023-10-23 ENCOUNTER — TRANSCRIPTION ENCOUNTER (OUTPATIENT)
Age: 43
End: 2023-10-23

## 2023-10-24 ENCOUNTER — APPOINTMENT (OUTPATIENT)
Dept: BARIATRICS | Facility: CLINIC | Age: 43
End: 2023-10-24
Payer: COMMERCIAL

## 2023-10-24 VITALS
WEIGHT: 186.31 LBS | DIASTOLIC BLOOD PRESSURE: 80 MMHG | HEART RATE: 85 BPM | OXYGEN SATURATION: 96 % | SYSTOLIC BLOOD PRESSURE: 128 MMHG | TEMPERATURE: 97.6 F | BODY MASS INDEX: 37.56 KG/M2 | HEIGHT: 59 IN

## 2023-10-24 PROCEDURE — 99215 OFFICE O/P EST HI 40 MIN: CPT

## 2023-10-24 PROCEDURE — G0447 BEHAVIOR COUNSEL OBESITY 15M: CPT | Mod: 59

## 2023-10-24 RX ORDER — LEVOTHYROXINE SODIUM 50 UG/1
50 TABLET ORAL
Qty: 90 | Refills: 2 | Status: ACTIVE | COMMUNITY
Start: 2020-08-31 | End: 1900-01-01

## 2023-10-30 LAB
25(OH)D3 SERPL-MCNC: 16.6 NG/ML
ALBUMIN SERPL ELPH-MCNC: 4.2 G/DL
ALP BLD-CCNC: 59 U/L
ALT SERPL-CCNC: 12 U/L
ANION GAP SERPL CALC-SCNC: 13 MMOL/L
AST SERPL-CCNC: 18 U/L
BASOPHILS # BLD AUTO: 0.04 K/UL
BASOPHILS NFR BLD AUTO: 0.4 %
BILIRUB SERPL-MCNC: 0.3 MG/DL
BUN SERPL-MCNC: 7 MG/DL
CALCIUM SERPL-MCNC: 9.4 MG/DL
CHLORIDE SERPL-SCNC: 103 MMOL/L
CHOLEST SERPL-MCNC: 171 MG/DL
CO2 SERPL-SCNC: 24 MMOL/L
CREAT SERPL-MCNC: 0.66 MG/DL
EGFR: 111 ML/MIN/1.73M2
EOSINOPHIL # BLD AUTO: 0.17 K/UL
EOSINOPHIL NFR BLD AUTO: 1.7 %
ESTIMATED AVERAGE GLUCOSE: 126 MG/DL
FOLATE SERPL-MCNC: 8.4 NG/ML
GLUCOSE SERPL-MCNC: 99 MG/DL
HBA1C MFR BLD HPLC: 6 %
HCT VFR BLD CALC: 34.6 %
HDLC SERPL-MCNC: 36 MG/DL
HGB BLD-MCNC: 10 G/DL
IMM GRANULOCYTES NFR BLD AUTO: 0.3 %
INSULIN P FAST SERPL-ACNC: 17 UU/ML
LDLC SERPL CALC-MCNC: 98 MG/DL
LYMPHOCYTES # BLD AUTO: 3.31 K/UL
LYMPHOCYTES NFR BLD AUTO: 32.3 %
MAN DIFF?: NORMAL
MCHC RBC-ENTMCNC: 22.8 PG
MCHC RBC-ENTMCNC: 28.9 GM/DL
MCV RBC AUTO: 78.8 FL
MONOCYTES # BLD AUTO: 0.6 K/UL
MONOCYTES NFR BLD AUTO: 5.9 %
NEUTROPHILS # BLD AUTO: 6.1 K/UL
NEUTROPHILS NFR BLD AUTO: 59.4 %
NONHDLC SERPL-MCNC: 135 MG/DL
PLATELET # BLD AUTO: 392 K/UL
POTASSIUM SERPL-SCNC: 3.7 MMOL/L
PROT SERPL-MCNC: 7.4 G/DL
RBC # BLD: 4.39 M/UL
RBC # FLD: 15.9 %
SODIUM SERPL-SCNC: 139 MMOL/L
T4 FREE SERPL-MCNC: 1.1 NG/DL
T4 SERPL-MCNC: 8 UG/DL
TRIGL SERPL-MCNC: 215 MG/DL
TSH SERPL-ACNC: 2.25 UIU/ML
VIT B12 SERPL-MCNC: 288 PG/ML
WBC # FLD AUTO: 10.25 K/UL

## 2023-10-30 RX ORDER — ERGOCALCIFEROL 1.25 MG/1
1.25 MG CAPSULE, LIQUID FILLED ORAL
Qty: 4 | Refills: 0 | Status: ACTIVE | COMMUNITY
Start: 2023-10-30 | End: 1900-01-01

## 2023-11-10 ENCOUNTER — APPOINTMENT (OUTPATIENT)
Dept: ENDOCRINOLOGY | Facility: CLINIC | Age: 43
End: 2023-11-10

## 2023-11-22 ENCOUNTER — APPOINTMENT (OUTPATIENT)
Age: 43
End: 2023-11-22
Payer: OTHER MISCELLANEOUS

## 2023-11-22 ENCOUNTER — NON-APPOINTMENT (OUTPATIENT)
Age: 43
End: 2023-11-22

## 2023-11-22 PROCEDURE — 29827 SHO ARTHRS SRG RT8TR CUF RPR: CPT | Mod: AS,RT

## 2023-11-22 PROCEDURE — 29823 SHO ARTHRS SRG XTNSV DBRDMT: CPT | Mod: 59,RT

## 2023-11-22 PROCEDURE — 29823 SHO ARTHRS SRG XTNSV DBRDMT: CPT | Mod: AS,59,RT

## 2023-11-22 PROCEDURE — 29827 SHO ARTHRS SRG RT8TR CUF RPR: CPT | Mod: RT

## 2023-11-22 RX ORDER — OXYCODONE AND ACETAMINOPHEN 5; 325 MG/1; MG/1
5-325 TABLET ORAL
Qty: 30 | Refills: 0 | Status: COMPLETED | COMMUNITY
Start: 2023-11-22 | End: 2023-11-27

## 2023-11-27 ENCOUNTER — APPOINTMENT (OUTPATIENT)
Dept: ORTHOPEDIC SURGERY | Facility: CLINIC | Age: 43
End: 2023-11-27
Payer: OTHER MISCELLANEOUS

## 2023-11-27 ENCOUNTER — APPOINTMENT (OUTPATIENT)
Dept: ENDOCRINOLOGY | Facility: CLINIC | Age: 43
End: 2023-11-27
Payer: COMMERCIAL

## 2023-11-27 ENCOUNTER — NON-APPOINTMENT (OUTPATIENT)
Age: 43
End: 2023-11-27

## 2023-11-27 VITALS — HEIGHT: 59 IN | WEIGHT: 186 LBS | BODY MASS INDEX: 37.5 KG/M2

## 2023-11-27 VITALS
DIASTOLIC BLOOD PRESSURE: 52 MMHG | SYSTOLIC BLOOD PRESSURE: 98 MMHG | HEIGHT: 59 IN | OXYGEN SATURATION: 97 % | HEART RATE: 71 BPM | BODY MASS INDEX: 37.2 KG/M2 | WEIGHT: 184.56 LBS

## 2023-11-27 LAB — GLUCOSE BLDC GLUCOMTR-MCNC: 120

## 2023-11-27 PROCEDURE — 82962 GLUCOSE BLOOD TEST: CPT

## 2023-11-27 PROCEDURE — 99024 POSTOP FOLLOW-UP VISIT: CPT

## 2023-11-27 PROCEDURE — 99213 OFFICE O/P EST LOW 20 MIN: CPT | Mod: 25

## 2023-11-30 ENCOUNTER — APPOINTMENT (OUTPATIENT)
Dept: ORTHOPEDIC SURGERY | Facility: CLINIC | Age: 43
End: 2023-11-30

## 2023-12-11 ENCOUNTER — APPOINTMENT (OUTPATIENT)
Dept: ORTHOPEDIC SURGERY | Facility: CLINIC | Age: 43
End: 2023-12-11
Payer: OTHER MISCELLANEOUS

## 2023-12-11 VITALS — BODY MASS INDEX: 37.09 KG/M2 | WEIGHT: 184 LBS | HEIGHT: 59 IN

## 2023-12-11 DIAGNOSIS — E53.8 DEFICIENCY OF OTHER SPECIFIED B GROUP VITAMINS: ICD-10-CM

## 2023-12-11 PROCEDURE — 99204 OFFICE O/P NEW MOD 45 MIN: CPT

## 2024-01-08 ENCOUNTER — APPOINTMENT (OUTPATIENT)
Dept: ORTHOPEDIC SURGERY | Facility: CLINIC | Age: 44
End: 2024-01-08
Payer: OTHER MISCELLANEOUS

## 2024-01-08 VITALS — WEIGHT: 184 LBS | HEIGHT: 59 IN | BODY MASS INDEX: 37.09 KG/M2

## 2024-01-08 PROCEDURE — 99024 POSTOP FOLLOW-UP VISIT: CPT

## 2024-01-08 NOTE — HISTORY OF PRESENT ILLNESS
[Work related] : work related [7] : 7 [5] : 5 [Throbbing] : throbbing [Tingling] : tingling [Constant] : constant [Household chores] : household chores [Leisure] : leisure [Sleep] : sleep [Lying in bed] : lying in bed [Not working due to injury] : Work status: not working due to injury [de-identified] : WC DOI 1/01/23: Post op right shoulder, DOS 11/22/23.Numbness in the ring and pinky and pain in the thumb  [] : This patient has had an injection before: no [FreeTextEntry1] : Right shoulder [FreeTextEntry3] : 1/01/23 [FreeTextEntry6] : numbness [FreeTextEntry7] : to her fingers [FreeTextEntry9] : Tylenol [de-identified] : 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

## 2024-01-08 NOTE — DISCUSSION/SUMMARY
[de-identified] : General Dx Discussion The patient was advised of the diagnosis. The natural history of the pathology was explained in full to the patient in layman's terms. All questions were answered. The risks and benefits of surgical and non-surgical treatment alternatives were explained in full to the patient.  Case discussed. D/C immobilizer. Continue PT, may begin AROM. Follow up in 4 weeks.   Entered by SHADIA George acting as scribe. - The documentation recorded by the scribe accurately reflects the service I personally performed and the decisions made by me.

## 2024-01-08 NOTE — WORK
[Total (100%)] : total (100%) [Does not reveal pre-existing condition(s) that may affect treatment/prognosis] : does not reveal pre-existing condition(s) that may affect treatment/prognosis [Cannot return to work because ________] : cannot return to work because [unfilled] [Unknown at this time] : : unknown at this time [Rx may affect patient's ability to return to work, make patient drowsy, or other issue] : Rx may affect patient's ability to return to work, make patient drowsy, or other issue. [I provided the services listed above] :  I provided the services listed above.

## 2024-01-08 NOTE — PHYSICAL EXAM
[Right] : right shoulder [] : motor and sensory intact distally [TWNoteComboBox7] : active forward flexion 170 degrees

## 2024-01-26 NOTE — HISTORY OF PRESENT ILLNESS
Duplicate request. Response will be provided via patient message   [Work related] : work related [6] : 6 [4] : 4 [Burning] : burning [Sharp] : sharp [Throbbing] : throbbing [] : yes [Constant] : constant [Household chores] : household chores [Leisure] : leisure [Work] : work [Sleep] : sleep [Meds] : meds [Ice] : ice [Heat] : heat [Part time] : Work status: part time [de-identified] : Patient is here with worsening right shoulder pain. [FreeTextEntry1] : Right shoulder [FreeTextEntry3] : 1/1/23 [FreeTextEntry6] : numbness in her hand [FreeTextEntry7] : just past her elbow [de-identified] : activity [de-identified] : Physical therapy 2 x a week, [facility].

## 2024-02-12 ENCOUNTER — APPOINTMENT (OUTPATIENT)
Dept: ORTHOPEDIC SURGERY | Facility: CLINIC | Age: 44
End: 2024-02-12
Payer: OTHER MISCELLANEOUS

## 2024-02-12 ENCOUNTER — NON-APPOINTMENT (OUTPATIENT)
Age: 44
End: 2024-02-12

## 2024-02-12 VITALS — BODY MASS INDEX: 37.09 KG/M2 | HEIGHT: 59 IN | WEIGHT: 184 LBS

## 2024-02-12 PROCEDURE — 99024 POSTOP FOLLOW-UP VISIT: CPT

## 2024-02-12 RX ORDER — CYCLOBENZAPRINE HYDROCHLORIDE 5 MG/1
5 TABLET, FILM COATED ORAL
Qty: 30 | Refills: 0 | Status: ACTIVE | COMMUNITY
Start: 2024-02-12 | End: 1900-01-01

## 2024-02-12 NOTE — PHYSICAL EXAM
[Right] : right shoulder [] : trapezius tenderness [Standing] : standing [TWNoteComboBox7] : active forward flexion 170 degrees

## 2024-02-12 NOTE — DISCUSSION/SUMMARY
[de-identified] : General Dx Discussion The patient was advised of the diagnosis. The natural history of the pathology was explained in full to the patient in layman's terms. All questions were answered. The risks and benefits of surgical and non-surgical treatment alternatives were explained in full to the patient.  Case discussed. Continue PT and [facility]. Recommend acupuncture for the trapezius. No work.  Follow up in 4 weeks.   Entered by SHADIA George acting as scribe. - The documentation recorded by the scribe accurately reflects the service I personally performed and the decisions made by me.

## 2024-02-12 NOTE — HISTORY OF PRESENT ILLNESS
[Work related] : work related [7] : 7 [5] : 5 [Throbbing] : throbbing [Tingling] : tingling [Constant] : constant [Household chores] : household chores [Leisure] : leisure [Sleep] : sleep [Lying in bed] : lying in bed [Not working due to injury] : Work status: not working due to injury [de-identified] : WC DOI 1/01/23: Post op right shoulder, DOS 11/22/23.  States numbness and tingling have resolved. Now with posterior shoulder/trapezius pain especially with ER. 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

## 2024-03-18 ENCOUNTER — APPOINTMENT (OUTPATIENT)
Dept: ORTHOPEDIC SURGERY | Facility: CLINIC | Age: 44
End: 2024-03-18
Payer: OTHER MISCELLANEOUS

## 2024-03-18 VITALS — WEIGHT: 184 LBS | BODY MASS INDEX: 37.09 KG/M2 | HEIGHT: 59 IN

## 2024-03-18 PROCEDURE — 99213 OFFICE O/P EST LOW 20 MIN: CPT

## 2024-03-18 NOTE — DISCUSSION/SUMMARY
[de-identified] : General Dx Discussion The patient was advised of the diagnosis. The natural history of the pathology was explained in full to the patient in layman's terms. All questions were answered. The risks and benefits of surgical and non-surgical treatment alternatives were explained in full to the patient.  Case discussed. Continue PT and HEP. No work.  Follow up in 4 weeks.

## 2024-03-18 NOTE — REASON FOR VISIT
Solaraze Pregnancy And Lactation Text: This medication is Pregnancy Category B and is considered safe. There is some data to suggest avoiding during the third trimester. It is unknown if this medication is excreted in breast milk. [FreeTextEntry2] : WC follow up-right shoulder

## 2024-03-18 NOTE — PHYSICAL EXAM
[Right] : right shoulder [Standing] : standing [5 ___] : forward flexion 5[unfilled]/5 [5___] : internal rotation 5[unfilled]/5 [] : motor and sensory intact distally [TWNoteComboBox7] : active forward flexion 170 degrees [de-identified] : active abduction 130 degrees [TWNoteComboBox6] : internal rotation 60 degrees [de-identified] : external rotation 65 degrees

## 2024-03-18 NOTE — HISTORY OF PRESENT ILLNESS
[Work related] : work related [Not working due to injury] : Work status: not working due to injury [] : yes [4] : 4 [3] : 3 [Burning] : burning [Stabbing] : stabbing [Constant] : constant [Household chores] : household chores [Leisure] : leisure [Sleep] : sleep [Nothing helps with pain getting better] : Nothing helps with pain getting better [de-identified] : WC DOI 1/01/2023: Patient is here for a follow up on his right shoulder.  [FreeTextEntry1] : Right shoulder [FreeTextEntry3] : 01/01/2023 [de-identified] : Physical therapy 2 x a week, HEP. [de-identified] : after activity

## 2024-05-06 ENCOUNTER — APPOINTMENT (OUTPATIENT)
Dept: ORTHOPEDIC SURGERY | Facility: CLINIC | Age: 44
End: 2024-05-06
Payer: OTHER MISCELLANEOUS

## 2024-05-06 VITALS — WEIGHT: 184 LBS | HEIGHT: 59 IN | BODY MASS INDEX: 37.09 KG/M2

## 2024-05-06 DIAGNOSIS — M75.111 INCOMPLETE ROTATOR CUFF TEAR OR RUPTURE OF RIGHT SHOULDER, NOT SPECIFIED AS TRAUMATIC: ICD-10-CM

## 2024-05-06 PROCEDURE — 99214 OFFICE O/P EST MOD 30 MIN: CPT

## 2024-05-06 NOTE — DISCUSSION/SUMMARY
[de-identified] : General Dx Discussion The patient was advised of the diagnosis. The natural history of the pathology was explained in full to the patient in layman's terms. All questions were answered. The risks and benefits of surgical and non-surgical treatment alternatives were explained in full to the patient.  accupuncture did not help  she went to a PT session which helped  she is advised to cont PT for rom and strengthening let this serve as a letter of medical necessity

## 2024-05-06 NOTE — HISTORY OF PRESENT ILLNESS
[Work related] : work related [Burning] : burning [Stabbing] : stabbing [Constant] : constant [Household chores] : household chores [Leisure] : leisure [Sleep] : sleep [Nothing helps with pain getting better] : Nothing helps with pain getting better [Not working due to injury] : Work status: not working due to injury [] : yes [6] : 6 [4] : 4 [Dull/Aching] : dull/aching [de-identified] : WC DOI 1/01/2023: Patient is here for a follow up on his right shoulder.  [FreeTextEntry1] : Right shoulder [FreeTextEntry3] : 01/01/2023 [FreeTextEntry5] : Pt is here for a WC follow up of her right shoulder, pain is worse since the last visit  [de-identified] : after activity [de-identified] : Physical therapy 2 x a week, HEP.

## 2024-05-06 NOTE — WORK
[Does not reveal pre-existing condition(s) that may affect treatment/prognosis] : does not reveal pre-existing condition(s) that may affect treatment/prognosis [Unknown at this time] : : unknown at this time [I provided the services listed above] :  I provided the services listed above. [Partial] : partial [Can return to work with limitations on ______] : can return to work with limitations on [unfilled] [No Rx restrictions] : No Rx restrictions.

## 2024-05-06 NOTE — PHYSICAL EXAM
[Right] : right shoulder [Standing] : standing [5 ___] : forward flexion 5[unfilled]/5 [5___] : internal rotation 5[unfilled]/5 [] : negative drop-arm test [TWNoteComboBox7] : active forward flexion 170 degrees [de-identified] : active abduction 140 degrees [TWNoteComboBox6] : internal rotation 60 degrees [de-identified] : external rotation 70 degrees

## 2024-05-24 ENCOUNTER — APPOINTMENT (OUTPATIENT)
Dept: PAIN MANAGEMENT | Facility: CLINIC | Age: 44
End: 2024-05-24
Payer: OTHER MISCELLANEOUS

## 2024-05-24 VITALS — HEIGHT: 59 IN | WEIGHT: 185 LBS | BODY MASS INDEX: 37.29 KG/M2

## 2024-05-24 DIAGNOSIS — M54.2 CERVICALGIA: ICD-10-CM

## 2024-05-24 DIAGNOSIS — M79.10 MYALGIA, UNSPECIFIED SITE: ICD-10-CM

## 2024-05-24 PROCEDURE — 99243 OFF/OP CNSLTJ NEW/EST LOW 30: CPT | Mod: 25

## 2024-05-24 PROCEDURE — J3490M: CUSTOM

## 2024-05-24 PROCEDURE — 20552 NJX 1/MLT TRIGGER POINT 1/2: CPT

## 2024-05-24 NOTE — HISTORY OF PRESENT ILLNESS
[Work related] : work related [8] : 8 [5] : 5 [Sharp] : sharp [Constant] : constant [Household chores] : household chores [Leisure] : leisure [Sleep] : sleep [Nothing helps with pain getting better] : Nothing helps with pain getting better [Lying in bed] : lying in bed [] : yes [FreeTextEntry1] : rt shoulder   [FreeTextEntry3] : 1/1/23 [FreeTextEntry6] : pressure, twisting [FreeTextEntry7] : fingers [de-identified] : Sleeping with arm up [de-identified] : RT SHOULDER AT [facility]

## 2024-05-24 NOTE — PROCEDURE
[FreeTextEntry3] : Procedure Name: Trigger Point Injection (1-2 muscle groups): Celestone and Marcaine Trigger Point was performed because of pain.  Celestone: An injection of Celestone 6 mg Marcaine: An injection of Marcaine 10 mg Medication was injected in the Right Trapezius muscle.   Patient has tried OTC's including aspirin, Ibuprofen, Aleve etc or prescription NSAIDS, and/or exercises at home and/ or physical therapy without satisfactory response. After verbal consent using sterile preparation and technique.The risks, benefits, and alternatives to cortisone injection were explained in full to the patient. Risks outlined include but are not limited to infection, sepsis, bleeding, scarring, skin discoloration, temporary increase in pain, syncopal episode, failure to resolve symptoms, allergic reaction, symptom recurrence, and elevation of blood sugar in diabetics. Patient understood the risks. All questions were answered. After discussion of options, patient requested an injection. Oral informed consent was obtained and sterile prep was done of the injection site. Sterile technique was utilized for the procedure including the preparation of the solutions used for the injection. Patient tolerated the procedure well. Advised to ice the injection site this evening. Prep with alcohol locally to site. Sterile technique used.

## 2024-05-24 NOTE — DISCUSSION/SUMMARY
[de-identified] : After discussing various treatment options with the patient including but not limited to oral medications, physical therapy, exercise modalities as well as interventional spinal injections, we have decided with the following plan:  - Continue home exercises, stretching, activity modification, physical therapy, and conservative care. - Follow-up as needed.

## 2024-05-24 NOTE — PHYSICAL EXAM
[de-identified] : Constitutional; Appears well, no apparent distress Ability to communicate: Normal  Respiratory: non-labored breathing Skin: No rash noted Head: Normocephalic, atraumatic Neck: no visible thyroid enlargement Eyes: Extraocular movements intact Neurologic: Alert and oriented x3 Psychiatric: normal mood, affect and behavior [] : positive [place]

## 2024-06-03 ENCOUNTER — APPOINTMENT (OUTPATIENT)
Dept: ENDOCRINOLOGY | Facility: CLINIC | Age: 44
End: 2024-06-03
Payer: COMMERCIAL

## 2024-06-03 VITALS
BODY MASS INDEX: 40.92 KG/M2 | OXYGEN SATURATION: 96 % | HEIGHT: 59 IN | DIASTOLIC BLOOD PRESSURE: 76 MMHG | WEIGHT: 203 LBS | SYSTOLIC BLOOD PRESSURE: 140 MMHG | HEART RATE: 75 BPM

## 2024-06-03 DIAGNOSIS — E03.9 HYPOTHYROIDISM, UNSPECIFIED: ICD-10-CM

## 2024-06-03 DIAGNOSIS — R73.02 IMPAIRED GLUCOSE TOLERANCE (ORAL): ICD-10-CM

## 2024-06-03 DIAGNOSIS — E55.9 VITAMIN D DEFICIENCY, UNSPECIFIED: ICD-10-CM

## 2024-06-03 DIAGNOSIS — E04.1 NONTOXIC SINGLE THYROID NODULE: ICD-10-CM

## 2024-06-03 DIAGNOSIS — E66.9 OBESITY, UNSPECIFIED: ICD-10-CM

## 2024-06-03 PROCEDURE — 99214 OFFICE O/P EST MOD 30 MIN: CPT | Mod: 25

## 2024-06-03 PROCEDURE — 36415 COLL VENOUS BLD VENIPUNCTURE: CPT

## 2024-06-03 NOTE — ASSESSMENT
[Levothyroxine] : The patient was instructed to take Levothyroxine on an empty stomach, separate from vitamins, and wait at least 30 minutes before eating [FreeTextEntry1] : Hypothyroidism w/ nodular goiter No recent labs to review Continue Synthroid 50mcg daily Patient has h/o thyroid FNA of 2.0x1.4x1.8cm right midpole nodule, FNA pathology was noted as benign Reviewed thyroid sonogram from Feb 2022, recommended to repeat sonogram before next endocrine visit, ordered for sonogram  Impaired glucose tolerance Manny check serum HgbA1c today  Noted BMI today is 41, regained 30lbs since last office visit  Patient working on diet; seeing [facility] weight management office regularly Was previously on Metformin and Mounjaro, but will f/u with weight management when she is ready to resume medications as she is currently recovering from shoulder surgery and has been unable to exercise much.  Vitamin D deficiency Currently on D3 supplementation, will check level today  Answered all questions today; patient verbalized understanding of the above RTO in 6 months

## 2024-06-03 NOTE — HISTORY OF PRESENT ILLNESS
[FreeTextEntry1] : This is a 44 yo female w/ hypothyroidism, IGT, obesity, DORIAN who presents for hypothyroidism management  She is  on Synthroid 50mcg daily for hypothyroidism. She has h/o thyroid nodule 1.8cm which was biopsied in past and documented as benign pathology as per chart review.  Noted vitamin D level was low and started on ergocalciferol 50,000x weekly for 1 month then instructed to take vitamin D3 2000IU daily. She is also taking B12 1000mcg daily. She is also following with medical weight management office at [facility], was on Mounjaro and Metformin, now not on medications. 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 Shehas 4 kids.

## 2024-06-03 NOTE — PHYSICAL EXAM
[Alert] : alert [No Acute Distress] : no acute distress [Normal Sclera/Conjunctiva] : normal sclera/conjunctiva [No Proptosis] : no proptosis

## 2024-06-17 ENCOUNTER — APPOINTMENT (OUTPATIENT)
Dept: ORTHOPEDIC SURGERY | Facility: CLINIC | Age: 44
End: 2024-06-17
Payer: OTHER MISCELLANEOUS

## 2024-06-17 VITALS — WEIGHT: 190 LBS | HEIGHT: 60 IN | BODY MASS INDEX: 37.3 KG/M2

## 2024-06-17 DIAGNOSIS — Z98.890 OTHER SPECIFIED POSTPROCEDURAL STATES: ICD-10-CM

## 2024-06-17 DIAGNOSIS — M77.8 OTHER ENTHESOPATHIES, NOT ELSEWHERE CLASSIFIED: ICD-10-CM

## 2024-06-17 DIAGNOSIS — S46.911D STRAIN OF UNSPECIFIED MUSCLE, FASCIA AND TENDON AT SHOULDER AND UPPER ARM LEVEL, RIGHT ARM, SUBSEQUENT ENCOUNTER: ICD-10-CM

## 2024-06-17 PROCEDURE — 99214 OFFICE O/P EST MOD 30 MIN: CPT

## 2024-06-17 NOTE — DISCUSSION/SUMMARY
[de-identified] : General Dx Discussion The patient was advised of the diagnosis. The natural history of the pathology was explained in full to the patient in layman's terms. All questions were answered. The risks and benefits of surgical and non-surgical treatment alternatives were explained in full to the patient.  Case Discussed. Continue PT and HEP. She is returned back to work tomorrow.  f/u 6 weeks.

## 2024-06-17 NOTE — HISTORY OF PRESENT ILLNESS
[Work related] : work related [9] : 9 [5] : 5 [Dull/Aching] : dull/aching [] : yes [de-identified] : WC  Here for f/u right shoulder. s/p arthroscopy, debridement and rcr on 11/22/23. She is doing ok. Was just approved for 8 sessions of PT.  [FreeTextEntry3] : 1/1/2023 [de-identified] : PT

## 2024-06-17 NOTE — PHYSICAL EXAM
[Right] : right shoulder [Standing] : standing [5 ___] : forward flexion 5[unfilled]/5 [5___] : internal rotation 5[unfilled]/5 [] : motor and sensory intact distally [TWNoteComboBox7] : active forward flexion 170 degrees [de-identified] : active abduction 140 degrees [TWNoteComboBox6] : internal rotation 60 degrees [de-identified] : external rotation 70 degrees

## 2024-06-17 NOTE — WORK
[Partial] : partial [Does not reveal pre-existing condition(s) that may affect treatment/prognosis] : does not reveal pre-existing condition(s) that may affect treatment/prognosis [Can return to work with limitations on ______] : can return to work with limitations on [unfilled] [Unknown at this time] : : unknown at this time [No Rx restrictions] : No Rx restrictions. [I provided the services listed above] :  I provided the services listed above.

## 2024-06-25 LAB
25(OH)D3 SERPL-MCNC: 17.8 NG/ML
ALBUMIN SERPL ELPH-MCNC: 4.1 G/DL
ALP BLD-CCNC: 81 U/L
ALT SERPL-CCNC: 24 U/L
ANION GAP SERPL CALC-SCNC: 14 MMOL/L
AST SERPL-CCNC: 21 U/L
BILIRUB SERPL-MCNC: 0.3 MG/DL
BUN SERPL-MCNC: 10 MG/DL
CALCIUM SERPL-MCNC: 9.2 MG/DL
CHLORIDE SERPL-SCNC: 102 MMOL/L
CO2 SERPL-SCNC: 23 MMOL/L
CREAT SERPL-MCNC: 0.67 MG/DL
EGFR: 110 ML/MIN/1.73M2
ESTIMATED AVERAGE GLUCOSE: 134 MG/DL
GLUCOSE SERPL-MCNC: 130 MG/DL
HBA1C MFR BLD HPLC: 6.3 %
POTASSIUM SERPL-SCNC: 4.2 MMOL/L
PROT SERPL-MCNC: 7.2 G/DL
SODIUM SERPL-SCNC: 138 MMOL/L
T4 FREE SERPL-MCNC: 1.1 NG/DL
TSH SERPL-ACNC: 1.73 UIU/ML

## 2024-06-26 ENCOUNTER — TRANSCRIPTION ENCOUNTER (OUTPATIENT)
Age: 44
End: 2024-06-26

## 2024-06-26 ENCOUNTER — NON-APPOINTMENT (OUTPATIENT)
Age: 44
End: 2024-06-26

## 2024-07-26 ENCOUNTER — RX RENEWAL (OUTPATIENT)
Age: 44
End: 2024-07-26

## 2024-08-01 ENCOUNTER — APPOINTMENT (OUTPATIENT)
Dept: ORTHOPEDIC SURGERY | Facility: CLINIC | Age: 44
End: 2024-08-01
Payer: OTHER MISCELLANEOUS

## 2024-08-01 VITALS — BODY MASS INDEX: 35.34 KG/M2 | HEIGHT: 60 IN | WEIGHT: 180 LBS

## 2024-08-01 DIAGNOSIS — M75.111 INCOMPLETE ROTATOR CUFF TEAR OR RUPTURE OF RIGHT SHOULDER, NOT SPECIFIED AS TRAUMATIC: ICD-10-CM

## 2024-08-01 DIAGNOSIS — S46.911D STRAIN OF UNSPECIFIED MUSCLE, FASCIA AND TENDON AT SHOULDER AND UPPER ARM LEVEL, RIGHT ARM, SUBSEQUENT ENCOUNTER: ICD-10-CM

## 2024-08-01 DIAGNOSIS — Z98.890 OTHER SPECIFIED POSTPROCEDURAL STATES: ICD-10-CM

## 2024-08-01 PROCEDURE — 99214 OFFICE O/P EST MOD 30 MIN: CPT

## 2024-08-01 NOTE — HISTORY OF PRESENT ILLNESS
[9] : 9 [5] : 5 [Dull/Aching] : dull/aching [Stabbing] : stabbing [Constant] : constant [] : yes [Household chores] : household chores [Leisure] : leisure [Work] : work [Sleep] : sleep [Rest] : rest [Ice] : ice [Full time] : Work status: full time [de-identified] : WC DOI 1/01/23: Patient is here for a follow up on her right shoulder. s/p arthroscopy, debridement and rcr on 11/22/23. She is still having some pain. Doing HEP. [FreeTextEntry1] : Right shoulder [FreeTextEntry6] : scapula and a little pain radiating to her hand  [de-identified] : working, lifting, movement [de-identified] : HEP

## 2024-08-01 NOTE — DISCUSSION/SUMMARY
[de-identified] : General Dx Discussion The patient was advised of the diagnosis. The natural history of the pathology was explained in full to the patient in layman's terms. All questions were answered. The risks and benefits of surgical and non-surgical treatment alternatives were explained in full to the patient.  Case Discussed. Continue HEP. f/u 6 weeks.  Discussed [facility] in november.     Entered by Elaine RENO acting as a scribe. Instructions: Dr. Camacho- The documentation recorded by the scribe accurately reflects the service I personally performed and the decisions made by me.

## 2024-08-01 NOTE — PHYSICAL EXAM
[Right] : right shoulder [Standing] : standing [5 ___] : forward flexion 5[unfilled]/5 [5___] : internal rotation 5[unfilled]/5 [] : motor and sensory intact distally [TWNoteComboBox7] : active forward flexion 170 degrees [de-identified] : active abduction 140 degrees [TWNoteComboBox6] : internal rotation 60 degrees [de-identified] : external rotation 70 degrees

## 2024-08-26 ENCOUNTER — TRANSCRIPTION ENCOUNTER (OUTPATIENT)
Age: 44
End: 2024-08-26

## 2024-09-06 ENCOUNTER — OUTPATIENT (OUTPATIENT)
Dept: OUTPATIENT SERVICES | Facility: HOSPITAL | Age: 44
LOS: 1 days | Discharge: HOME | End: 2024-09-06

## 2024-09-06 ENCOUNTER — RESULT REVIEW (OUTPATIENT)
Age: 44
End: 2024-09-06

## 2024-09-06 ENCOUNTER — APPOINTMENT (OUTPATIENT)
Dept: HEMATOLOGY ONCOLOGY | Facility: CLINIC | Age: 44
End: 2024-09-06
Payer: COMMERCIAL

## 2024-09-06 VITALS
DIASTOLIC BLOOD PRESSURE: 79 MMHG | WEIGHT: 203.99 LBS | HEART RATE: 69 BPM | SYSTOLIC BLOOD PRESSURE: 122 MMHG | HEIGHT: 60 IN | BODY MASS INDEX: 40.05 KG/M2 | OXYGEN SATURATION: 100 % | TEMPERATURE: 98 F | RESPIRATION RATE: 16 BRPM

## 2024-09-06 DIAGNOSIS — D72.829 ELEVATED WHITE BLOOD CELL COUNT, UNSPECIFIED: ICD-10-CM

## 2024-09-06 PROCEDURE — 99214 OFFICE O/P EST MOD 30 MIN: CPT

## 2024-09-08 NOTE — HISTORY OF PRESENT ILLNESS
[de-identified] : Patient was on iron tablets since 2023 where Hg  was stable the 11-12g/dl range. Then in August patient had a very very heavy 10 day menstrual bleeding which was suspected to be dysfunctional bleeding and sure enough OB GYN found an endometrial cyst.  Patient returns for worsening iron deficiency anemia.   GYN has another appointment in  a month  8/20/24 CBC WBC 12.7 hg 7.5g/dl MCV 76 plates 339 at [facility].

## 2024-09-08 NOTE — HISTORY OF PRESENT ILLNESS
[de-identified] : Patient was on iron tablets since 2023 where Hg  was stable the 11-12g/dl range. Then in August patient had a very very heavy 10 day menstrual bleeding which was suspected to be dysfunctional bleeding and sure enough OB GYN found an endometrial cyst.  Patient returns for worsening iron deficiency anemia.   GYN has another appointment in  a month  8/20/24 CBC WBC 12.7 hg 7.5g/dl MCV 76 plates 339 at [facility].

## 2024-09-08 NOTE — ASSESSMENT
[FreeTextEntry1] : 43 year old with what is now a mild iron deficiency.   Hgb 11.2, MCV 76.6, 24.1, MCHC 31.5, patient asymptomatic, to continue current regimen of iron supplementation with ferrous gluconate 38mg daily.    This saws sufficient to keep Hg stable for ar while, but an increased menorrhagia noted this year, patient can restart IV ferriheme.

## 2024-09-10 LAB
FERRITIN SERPL-MCNC: 5 NG/ML
FOLATE SERPL-MCNC: 8.8 NG/ML
IRON SATN MFR SERPL: 4 %
IRON SERPL-MCNC: 18 UG/DL
LDH SERPL-CCNC: 182 U/L
TIBC SERPL-MCNC: 452 UG/DL
UIBC SERPL-MCNC: 434 UG/DL
VIT B12 SERPL-MCNC: 277 PG/ML

## 2024-09-11 ENCOUNTER — APPOINTMENT (OUTPATIENT)
Dept: BARIATRICS | Facility: CLINIC | Age: 44
End: 2024-09-11

## 2024-09-11 VITALS — BODY MASS INDEX: 39.84 KG/M2 | WEIGHT: 204 LBS

## 2024-09-11 DIAGNOSIS — E11.9 TYPE 2 DIABETES MELLITUS W/OUT COMPLICATIONS: ICD-10-CM

## 2024-09-11 DIAGNOSIS — E53.8 DEFICIENCY OF OTHER SPECIFIED B GROUP VITAMINS: ICD-10-CM

## 2024-09-11 DIAGNOSIS — E66.9 OBESITY, UNSPECIFIED: ICD-10-CM

## 2024-09-11 DIAGNOSIS — E03.9 HYPOTHYROIDISM, UNSPECIFIED: ICD-10-CM

## 2024-09-11 DIAGNOSIS — R73.02 IMPAIRED GLUCOSE TOLERANCE (ORAL): ICD-10-CM

## 2024-09-11 DIAGNOSIS — G47.30 SLEEP APNEA, UNSPECIFIED: ICD-10-CM

## 2024-09-11 DIAGNOSIS — E04.1 NONTOXIC SINGLE THYROID NODULE: ICD-10-CM

## 2024-09-11 DIAGNOSIS — E55.9 VITAMIN D DEFICIENCY, UNSPECIFIED: ICD-10-CM

## 2024-09-11 PROCEDURE — G0447 BEHAVIOR COUNSEL OBESITY 15M: CPT | Mod: 59,95

## 2024-09-11 PROCEDURE — 99401 PREV MED CNSL INDIV APPRX 15: CPT

## 2024-09-11 PROCEDURE — 99213 OFFICE O/P EST LOW 20 MIN: CPT

## 2024-09-11 RX ORDER — TIRZEPATIDE 2.5 MG/.5ML
2.5 INJECTION, SOLUTION SUBCUTANEOUS
Qty: 1 | Refills: 2 | Status: ACTIVE | COMMUNITY
Start: 2024-09-11 | End: 1900-01-01

## 2024-09-13 ENCOUNTER — APPOINTMENT (OUTPATIENT)
Dept: INFUSION THERAPY | Facility: HOSPITAL | Age: 44
End: 2024-09-13

## 2024-09-13 NOTE — PATIENT PROFILE OB - WEIGHT : PRESENT IN LBS
Denies known Latex allergy or symptoms of Latex sensitivity.  Medications reviewed and updated.  Tobacco history verified 9/13/2024    Patient presents today for a recheck.  He is due for a medicare wellness visit.       Health Maintenance       DTaP/Tdap/Td Vaccine (1 - Tdap)  Overdue since 11/7/2006    COVID-19 Vaccine (3 - Pfizer risk series)  Overdue since 5/7/2021    Medicare Advantage- Medicare Wellness Visit (Yearly - January to December)  Overdue since 1/1/2024    Depression Screening (Yearly)  Overdue since 8/4/2024    Influenza Vaccine (1)  Due since 9/1/2024    DM/CKD Microalbumin Ratio (Yearly)  Postponed until 1/26/2025           Following review of the above:  Patient is not proceeding with: Influenza    Note: Refer to final orders and clinician documentation.         208

## 2024-09-18 ENCOUNTER — APPOINTMENT (OUTPATIENT)
Dept: BARIATRICS | Facility: CLINIC | Age: 44
End: 2024-09-18

## 2024-09-20 ENCOUNTER — TRANSCRIPTION ENCOUNTER (OUTPATIENT)
Age: 44
End: 2024-09-20

## 2024-09-20 ENCOUNTER — APPOINTMENT (OUTPATIENT)
Dept: INFUSION THERAPY | Facility: HOSPITAL | Age: 44
End: 2024-09-20

## 2024-09-22 ENCOUNTER — NON-APPOINTMENT (OUTPATIENT)
Age: 44
End: 2024-09-22

## 2024-09-23 ENCOUNTER — TRANSCRIPTION ENCOUNTER (OUTPATIENT)
Age: 44
End: 2024-09-23

## 2024-09-24 ENCOUNTER — TRANSCRIPTION ENCOUNTER (OUTPATIENT)
Age: 44
End: 2024-09-24

## 2024-09-26 ENCOUNTER — TRANSCRIPTION ENCOUNTER (OUTPATIENT)
Age: 44
End: 2024-09-26

## 2024-09-26 RX ORDER — TIRZEPATIDE 2.5 MG/.5ML
2.5 INJECTION, SOLUTION SUBCUTANEOUS
Qty: 1 | Refills: 0 | Status: ACTIVE | COMMUNITY
Start: 2024-09-26 | End: 1900-01-01

## 2024-09-27 ENCOUNTER — EMERGENCY (EMERGENCY)
Facility: HOSPITAL | Age: 44
LOS: 1 days | Discharge: HOME | End: 2024-09-27
Admitting: STUDENT IN AN ORGANIZED HEALTH CARE EDUCATION/TRAINING PROGRAM
Payer: OTHER MISCELLANEOUS

## 2024-09-27 VITALS
RESPIRATION RATE: 16 BRPM | OXYGEN SATURATION: 97 % | HEIGHT: 59 IN | WEIGHT: 199.96 LBS | DIASTOLIC BLOOD PRESSURE: 81 MMHG | SYSTOLIC BLOOD PRESSURE: 131 MMHG | TEMPERATURE: 98 F | HEART RATE: 70 BPM

## 2024-09-27 PROCEDURE — 72125 CT NECK SPINE W/O DYE: CPT | Mod: 26,MC

## 2024-09-27 PROCEDURE — 70486 CT MAXILLOFACIAL W/O DYE: CPT | Mod: 26,MC

## 2024-09-27 PROCEDURE — 70450 CT HEAD/BRAIN W/O DYE: CPT | Mod: 26,MC

## 2024-09-27 PROCEDURE — 99284 EMERGENCY DEPT VISIT MOD MDM: CPT

## 2024-09-27 RX ORDER — KETOROLAC TROMETHAMINE 30 MG/ML
30 INJECTION, SOLUTION INTRAMUSCULAR ONCE
Refills: 0 | Status: DISCONTINUED | OUTPATIENT
Start: 2024-09-27 | End: 2024-09-27

## 2024-09-27 RX ORDER — METOCLOPRAMIDE HCL 5 MG
10 TABLET ORAL ONCE
Refills: 0 | Status: COMPLETED | OUTPATIENT
Start: 2024-09-27 | End: 2024-09-27

## 2024-09-27 RX ADMIN — KETOROLAC TROMETHAMINE 30 MILLIGRAM(S): 30 INJECTION, SOLUTION INTRAMUSCULAR at 21:26

## 2024-09-27 RX ADMIN — Medication 10 MILLIGRAM(S): at 21:27

## 2024-09-27 NOTE — ED ADULT NURSE NOTE - CHIEF COMPLAINT QUOTE
c/o pain to face, specially under left eye s/p hit in face with soccer ball while at work Pt does not remember getting hit with ball, states"saw stars". Unsure if LOC . Abrasion noted to right side of nose bridge. Evaluated at [facility] ED.

## 2024-09-27 NOTE — ED ADULT TRIAGE NOTE - CHIEF COMPLAINT QUOTE
c/o pain to face, specially under left eye s/p hit in face with soccer ball while at work Pt does not remember getting hit with ball, states"saw stars". Unsure if LOC . Abrasion noted to right side of nose bridge. Evaluated at [facility] ED.

## 2024-09-27 NOTE — ED ADULT NURSE NOTE - OBJECTIVE STATEMENT
Patient received in wellness, exam room 3. Patient A&Ox3 and ambulatory at baseline. Patient referred to the ED by EMS for further evaluation of head pain. Patient denies significant phx. 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 Patient currently endorsing headache. Patient denies dizziness, lightheadedness, nausea/vomiting, fever/chills. Patient offers no complaints at this time. Respirations even and unlabored, no signs/symptoms of acute distress; patient denies dyspnea, shortness of breath, and chest pain. Patient is stable at this time. Steady gait observed.

## 2024-09-28 VITALS
OXYGEN SATURATION: 98 % | HEART RATE: 61 BPM | DIASTOLIC BLOOD PRESSURE: 71 MMHG | TEMPERATURE: 98 F | SYSTOLIC BLOOD PRESSURE: 109 MMHG | RESPIRATION RATE: 16 BRPM

## 2024-09-28 NOTE — ED PROVIDER NOTE - CLINICAL SUMMARY MEDICAL DECISION MAKING FREE TEXT BOX
Vitals are stable.  Exam as noted as above.  Differential includes but not limited to: Concussion versus intracranial bleed, skull fracture, facial bone fracture.  Will obtain CTs of head, neck, and face to rule out.  Follow progress notes to dispo.

## 2024-09-28 NOTE — ED PROVIDER NOTE - OBJECTIVE STATEMENT
44-year-old female with history of Hashimoto's thyroiditis, recently diagnosed with type 2 diabetes (not currently on medication),  presents complaining of persistent headache, left eye pain, facial pain following an incident where she was struck by a soccer ball.  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  Patient did not lose consciousness but notes that she has been having persistent pain since the incident.  Patient denies visual changes, blurry vision, loss of vision but states that her vision feels "off".  Associated with nausea but no vomiting.  No chest pain, abdominal pain, paresthesias, weakness.  No other voiced complaints.

## 2024-09-28 NOTE — ED PROVIDER NOTE - PATIENT PORTAL LINK FT
You can access the FollowMyHealth Patient Portal offered by [facility] by registering at the following website: [email or web address]. By joining [facility]’s FollowMyHealth portal, you will also be able to view your health information using other applications (apps) compatible with our system.

## 2024-09-28 NOTE — ED PROVIDER NOTE - PHYSICAL EXAMINATION
CONSTITUTIONAL: Well-appearing; well-nourished; in no apparent distress. Non-toxic appearing.   HEAD: No Raccoon eyes.   NEURO: Alert & oriented. Gait steady without assistance. Sensory and motor functions are grossly intact.  PSYCH: Mood appropriate. Thought processes intact.   EYES: Inferior orbit on the left tender to palpation, no ecchymosis, crepitus,  No lid edema, crusting, proptosis. Sclera white, conjunctiva pink.  ENT: Nose midline, tender to palpation. Small abrasions noted to the sides of the nasal bridge,  no rhinorrhea.   NECK: Supple. No midline bony tenderness, b/l paraspinous muscle tenderness.  ROM intact but painful.   CARD: Regular rate and rhythm, no murmurs  RESP: No accessory muscle use; breath sounds clear and equal bilaterally; no wheezes, rhonchi, or rales     MUSCULOSKELETAL/EXTREMITIES: FROM in all four extremities  SKIN: As noted above otherwise skin is dry and intact.

## 2024-09-28 NOTE — ED PROVIDER NOTE - NSICDXPASTMEDICALHX_GEN_ALL_CORE_FT
PAST MEDICAL HISTORY:  Herniated disc, cervical     Hypothyroidism     Morbid obesity     Sleep apnea has sleep studies in Jan 2015 and CPAP was recommended but Pt does not uses it

## 2024-10-16 ENCOUNTER — LABORATORY RESULT (OUTPATIENT)
Age: 44
End: 2024-10-16

## 2024-10-16 ENCOUNTER — APPOINTMENT (OUTPATIENT)
Dept: INTERNAL MEDICINE | Facility: CLINIC | Age: 44
End: 2024-10-16

## 2024-10-16 ENCOUNTER — NON-APPOINTMENT (OUTPATIENT)
Age: 44
End: 2024-10-16

## 2024-10-16 VITALS
WEIGHT: 198 LBS | OXYGEN SATURATION: 98 % | HEART RATE: 54 BPM | DIASTOLIC BLOOD PRESSURE: 100 MMHG | BODY MASS INDEX: 38.87 KG/M2 | SYSTOLIC BLOOD PRESSURE: 131 MMHG | HEIGHT: 60 IN

## 2024-10-16 VITALS — SYSTOLIC BLOOD PRESSURE: 112 MMHG | DIASTOLIC BLOOD PRESSURE: 66 MMHG

## 2024-10-16 DIAGNOSIS — Z78.9 OTHER SPECIFIED HEALTH STATUS: ICD-10-CM

## 2024-10-16 DIAGNOSIS — E11.9 TYPE 2 DIABETES MELLITUS W/OUT COMPLICATIONS: ICD-10-CM

## 2024-10-16 DIAGNOSIS — E66.9 OBESITY, UNSPECIFIED: ICD-10-CM

## 2024-10-16 DIAGNOSIS — Z12.11 ENCOUNTER FOR SCREENING FOR MALIGNANT NEOPLASM OF COLON: ICD-10-CM

## 2024-10-16 DIAGNOSIS — E55.9 VITAMIN D DEFICIENCY, UNSPECIFIED: ICD-10-CM

## 2024-10-16 DIAGNOSIS — D64.9 ANEMIA, UNSPECIFIED: ICD-10-CM

## 2024-10-16 DIAGNOSIS — Z23 ENCOUNTER FOR IMMUNIZATION: ICD-10-CM

## 2024-10-16 DIAGNOSIS — Z00.00 ENCOUNTER FOR GENERAL ADULT MEDICAL EXAMINATION W/OUT ABNORMAL FINDINGS: ICD-10-CM

## 2024-10-16 DIAGNOSIS — E53.8 DEFICIENCY OF OTHER SPECIFIED B GROUP VITAMINS: ICD-10-CM

## 2024-10-16 DIAGNOSIS — E03.9 HYPOTHYROIDISM, UNSPECIFIED: ICD-10-CM

## 2024-10-16 PROCEDURE — 93000 ELECTROCARDIOGRAM COMPLETE: CPT

## 2024-10-16 PROCEDURE — G0008: CPT

## 2024-10-16 PROCEDURE — 90656 IIV3 VACC NO PRSV 0.5 ML IM: CPT

## 2024-10-16 PROCEDURE — 36415 COLL VENOUS BLD VENIPUNCTURE: CPT

## 2024-10-16 PROCEDURE — 99386 PREV VISIT NEW AGE 40-64: CPT | Mod: 25

## 2024-10-16 RX ORDER — CHROMIUM 200 MCG
TABLET ORAL
Refills: 0 | Status: ACTIVE | COMMUNITY

## 2024-10-17 NOTE — ED ADULT NURSE NOTE - NSFALLOOBATTEMPT_ED_ALL_ED
No C/o UC bleeding.  88-year-old female with a past medical history of Parkinson's, dementia, hypertension, hyperlipidemia, history of epilepsy, CVA, and history of schizophrenia presents to the ED from [facility] for evaluation of fever that began today associated with patient being "hypoxic to 50%."As per patient's daughter at bedside they were told this by the [facility] home.  Patient's daughters report patient is wheelchair-bound at baseline and usually cannot engage in full conversation due to the dementia.  Patient's daughters noticed that patient is more lethargic than usual today.  Patient's daughter reports in the past they signed a MOLST form for DNR, DNI, do not hospitalize, and comfort measures only however this was signed when patient had a GI bleed in the past and they "thought patient was going to die." Patient's daughters would like to rescind the do not hospitalize and comfort measures only but would like to keep the patient DNR/DNI. Patient is O2 sat is 94% in the ED but we placed on 2L of O2 and goes to 98%. 88-year-old female with a past medical history of Parkinson's, dementia, hypertension, hyperlipidemia, history of epilepsy, CVA, and history of schizophrenia presents to the ED from [facility] for evaluation of fever that began today associated with patient being "hypoxic to 50%."As per patient's daughter at bedside they were told this by the [facility] home.  Patient's daughters report patient is wheelchair-bound at baseline and usually cannot engage in full conversation due to the dementia.  Patient's daughters noticed that patient is more lethargic than usual today.  Patient's daughter reports in the past they signed a MOLST form for DNR, DNI, do not hospitalize, and comfort measures only however this was signed when patient had a GI bleed in the past and they "thought patient was going to die." Patient's daughters would like to rescind the do not hospitalize and comfort measures only but would like to keep the patient DNR/DNI. Patient is O2 sat is 94% in the ED but we placed on 2L of O2 and goes to 98%.

## 2024-10-21 ENCOUNTER — TRANSCRIPTION ENCOUNTER (OUTPATIENT)
Age: 44
End: 2024-10-21

## 2024-10-21 LAB
25(OH)D3 SERPL-MCNC: 15.7 NG/ML
ALBUMIN SERPL ELPH-MCNC: 4.4 G/DL
ALP BLD-CCNC: 71 U/L
ALT SERPL-CCNC: 19 U/L
ANION GAP SERPL CALC-SCNC: 12 MMOL/L
APPEARANCE: ABNORMAL
AST SERPL-CCNC: 20 U/L
BACTERIA: ABNORMAL /HPF
BASOPHILS # BLD AUTO: 0 K/UL
BASOPHILS NFR BLD AUTO: 0 %
BILIRUB SERPL-MCNC: 0.2 MG/DL
BILIRUBIN URINE: NEGATIVE
BLOOD URINE: NEGATIVE
BUN SERPL-MCNC: 9 MG/DL
CALCIUM SERPL-MCNC: 9.1 MG/DL
CAST: 0 /LPF
CHLORIDE SERPL-SCNC: 105 MMOL/L
CHOLEST SERPL-MCNC: 182 MG/DL
CO2 SERPL-SCNC: 24 MMOL/L
COLOR: YELLOW
CREAT SERPL-MCNC: 0.73 MG/DL
EGFR: 103 ML/MIN/1.73M2
EOSINOPHIL # BLD AUTO: 0.42 K/UL
EOSINOPHIL NFR BLD AUTO: 4.4 %
EPITHELIAL CELLS: 6 /HPF
ESTIMATED AVERAGE GLUCOSE: 97 MG/DL
FERRITIN SERPL-MCNC: 58 NG/ML
FOLATE SERPL-MCNC: 10 NG/ML
GLUCOSE QUALITATIVE U: NEGATIVE MG/DL
GLUCOSE SERPL-MCNC: 105 MG/DL
HBA1C MFR BLD HPLC: 5 %
HCT VFR BLD CALC: 37.6 %
HDLC SERPL-MCNC: 34 MG/DL
HGB BLD-MCNC: 11.1 G/DL
IRON SATN MFR SERPL: 12 %
IRON SERPL-MCNC: 40 UG/DL
KETONES URINE: NEGATIVE MG/DL
LDLC SERPL CALC-MCNC: 117 MG/DL
LEUKOCYTE ESTERASE URINE: NEGATIVE
LYMPHOCYTES # BLD AUTO: 2.44 K/UL
LYMPHOCYTES NFR BLD AUTO: 25.7 %
MAN DIFF?: NORMAL
MCHC RBC-ENTMCNC: 25.8 PG
MCHC RBC-ENTMCNC: 29.5 GM/DL
MCV RBC AUTO: 87.4 FL
MICROSCOPIC-UA: NORMAL
MONOCYTES # BLD AUTO: 0.5 K/UL
MONOCYTES NFR BLD AUTO: 5.3 %
NEUTROPHILS # BLD AUTO: 6.14 K/UL
NEUTROPHILS NFR BLD AUTO: 64.6 %
NITRITE URINE: NEGATIVE
NONHDLC SERPL-MCNC: 147 MG/DL
PH URINE: 6
PLATELET # BLD AUTO: 353 K/UL
POTASSIUM SERPL-SCNC: 3.7 MMOL/L
PROT SERPL-MCNC: 7.3 G/DL
PROTEIN URINE: NORMAL MG/DL
RBC # BLD: 4.3 M/UL
RBC # FLD: 25 %
RED BLOOD CELLS URINE: NORMAL /HPF
REVIEW: NORMAL
SODIUM SERPL-SCNC: 141 MMOL/L
SPECIFIC GRAVITY URINE: >1.03
TIBC SERPL-MCNC: 323 UG/DL
TRIGL SERPL-MCNC: 169 MG/DL
TSH SERPL-ACNC: 1.41 UIU/ML
UIBC SERPL-MCNC: 283 UG/DL
UROBILINOGEN URINE: 0.2 MG/DL
VIT B12 SERPL-MCNC: 316 PG/ML
WBC # FLD AUTO: 9.51 K/UL
WHITE BLOOD CELLS URINE: 1 /HPF

## 2024-10-21 RX ORDER — CHOLECALCIFEROL (VITAMIN D3) 1250 MCG
1.25 MG CAPSULE ORAL
Qty: 12 | Refills: 0 | Status: ACTIVE | COMMUNITY
Start: 2024-10-21 | End: 1900-01-01

## 2024-10-23 ENCOUNTER — APPOINTMENT (OUTPATIENT)
Facility: CLINIC | Age: 44
End: 2024-10-23
Payer: COMMERCIAL

## 2024-10-23 VITALS
HEART RATE: 79 BPM | SYSTOLIC BLOOD PRESSURE: 110 MMHG | WEIGHT: 198 LBS | DIASTOLIC BLOOD PRESSURE: 65 MMHG | BODY MASS INDEX: 38.87 KG/M2 | HEIGHT: 60 IN | OXYGEN SATURATION: 99 %

## 2024-10-23 PROCEDURE — 99203 OFFICE O/P NEW LOW 30 MIN: CPT

## 2024-10-28 ENCOUNTER — RX RENEWAL (OUTPATIENT)
Age: 44
End: 2024-10-28

## 2024-10-28 RX ORDER — TIRZEPATIDE 2.5 MG/.5ML
2.5 INJECTION, SOLUTION SUBCUTANEOUS
Qty: 1 | Refills: 0 | Status: ACTIVE | COMMUNITY
Start: 2024-10-28 | End: 1900-01-01

## 2024-11-01 ENCOUNTER — APPOINTMENT (OUTPATIENT)
Dept: ORTHOPEDIC SURGERY | Facility: CLINIC | Age: 44
End: 2024-11-01
Payer: OTHER MISCELLANEOUS

## 2024-11-01 VITALS — HEIGHT: 60 IN | BODY MASS INDEX: 38.87 KG/M2 | WEIGHT: 198 LBS

## 2024-11-01 DIAGNOSIS — S46.911D STRAIN OF UNSPECIFIED MUSCLE, FASCIA AND TENDON AT SHOULDER AND UPPER ARM LEVEL, RIGHT ARM, SUBSEQUENT ENCOUNTER: ICD-10-CM

## 2024-11-01 DIAGNOSIS — Z98.890 OTHER SPECIFIED POSTPROCEDURAL STATES: ICD-10-CM

## 2024-11-01 DIAGNOSIS — M75.111 INCOMPLETE ROTATOR CUFF TEAR OR RUPTURE OF RIGHT SHOULDER, NOT SPECIFIED AS TRAUMATIC: ICD-10-CM

## 2024-11-01 DIAGNOSIS — M77.8 OTHER ENTHESOPATHIES, NOT ELSEWHERE CLASSIFIED: ICD-10-CM

## 2024-11-01 PROCEDURE — 99455 WORK RELATED DISABILITY EXAM: CPT

## 2024-11-12 ENCOUNTER — OUTPATIENT (OUTPATIENT)
Dept: OUTPATIENT SERVICES | Facility: HOSPITAL | Age: 44
LOS: 1 days | Discharge: HOME | End: 2024-11-12

## 2024-11-12 DIAGNOSIS — D69.6 THROMBOCYTOPENIA, UNSPECIFIED: ICD-10-CM

## 2024-11-12 DIAGNOSIS — D72.829 ELEVATED WHITE BLOOD CELL COUNT, UNSPECIFIED: ICD-10-CM

## 2024-11-13 ENCOUNTER — APPOINTMENT (OUTPATIENT)
Dept: BARIATRICS | Facility: CLINIC | Age: 44
End: 2024-11-13
Payer: COMMERCIAL

## 2024-11-13 VITALS
TEMPERATURE: 97 F | WEIGHT: 197 LBS | HEART RATE: 64 BPM | BODY MASS INDEX: 38.68 KG/M2 | DIASTOLIC BLOOD PRESSURE: 79 MMHG | SYSTOLIC BLOOD PRESSURE: 118 MMHG | HEIGHT: 60 IN | OXYGEN SATURATION: 97 %

## 2024-11-13 DIAGNOSIS — E03.9 HYPOTHYROIDISM, UNSPECIFIED: ICD-10-CM

## 2024-11-13 DIAGNOSIS — E55.9 VITAMIN D DEFICIENCY, UNSPECIFIED: ICD-10-CM

## 2024-11-13 DIAGNOSIS — E11.9 TYPE 2 DIABETES MELLITUS W/OUT COMPLICATIONS: ICD-10-CM

## 2024-11-13 DIAGNOSIS — E04.1 NONTOXIC SINGLE THYROID NODULE: ICD-10-CM

## 2024-11-13 DIAGNOSIS — E66.9 OBESITY, UNSPECIFIED: ICD-10-CM

## 2024-11-13 PROCEDURE — 99214 OFFICE O/P EST MOD 30 MIN: CPT

## 2024-11-13 PROCEDURE — G0447 BEHAVIOR COUNSEL OBESITY 15M: CPT | Mod: 59

## 2024-11-15 ENCOUNTER — RESULT REVIEW (OUTPATIENT)
Age: 44
End: 2024-11-15

## 2024-11-15 ENCOUNTER — APPOINTMENT (OUTPATIENT)
Dept: HEMATOLOGY ONCOLOGY | Facility: CLINIC | Age: 44
End: 2024-11-15
Payer: COMMERCIAL

## 2024-11-15 ENCOUNTER — APPOINTMENT (OUTPATIENT)
Dept: HEMATOLOGY ONCOLOGY | Facility: CLINIC | Age: 44
End: 2024-11-15

## 2024-11-15 VITALS
BODY MASS INDEX: 38.48 KG/M2 | TEMPERATURE: 98.2 F | WEIGHT: 196 LBS | HEIGHT: 60 IN | RESPIRATION RATE: 17 BRPM | DIASTOLIC BLOOD PRESSURE: 46 MMHG | HEART RATE: 80 BPM | SYSTOLIC BLOOD PRESSURE: 101 MMHG | OXYGEN SATURATION: 98 %

## 2024-11-15 LAB
BASOPHILS # BLD AUTO: 0.03 K/UL — SIGNIFICANT CHANGE UP (ref 0–0.2)
BASOPHILS NFR BLD AUTO: 0.3 % — SIGNIFICANT CHANGE UP (ref 0–2)
EOSINOPHIL # BLD AUTO: 0.5 K/UL — SIGNIFICANT CHANGE UP (ref 0–0.5)
EOSINOPHIL NFR BLD AUTO: 4.8 % — SIGNIFICANT CHANGE UP (ref 0–6)
HCT VFR BLD CALC: 35.8 % — SIGNIFICANT CHANGE UP (ref 34.5–45)
HGB BLD-MCNC: 11.2 G/DL — LOW (ref 11.5–15.5)
IMM GRANULOCYTES NFR BLD AUTO: 0.5 % — SIGNIFICANT CHANGE UP (ref 0–0.9)
LYMPHOCYTES # BLD AUTO: 29.9 % — SIGNIFICANT CHANGE UP (ref 13–44)
LYMPHOCYTES # BLD AUTO: 3.14 K/UL — SIGNIFICANT CHANGE UP (ref 1–3.3)
MCHC RBC-ENTMCNC: 27.4 PG — SIGNIFICANT CHANGE UP (ref 27–34)
MCHC RBC-ENTMCNC: 31.7 G/DL — LOW (ref 32–36)
MCV RBC AUTO: 86.3 FL — SIGNIFICANT CHANGE UP (ref 80–100)
MONOCYTES # BLD AUTO: 0.57 K/UL — SIGNIFICANT CHANGE UP (ref 0–0.9)
MONOCYTES NFR BLD AUTO: 5.4 % — SIGNIFICANT CHANGE UP (ref 2–14)
NEUTROPHILS # BLD AUTO: 6.21 K/UL — SIGNIFICANT CHANGE UP (ref 1.8–7.4)
NEUTROPHILS NFR BLD AUTO: 59.1 % — SIGNIFICANT CHANGE UP (ref 43–77)
NRBC # BLD: 0 /100 WBCS — SIGNIFICANT CHANGE UP (ref 0–0)
NRBC BLD-RTO: 0 /100 WBCS — SIGNIFICANT CHANGE UP (ref 0–0)
PLATELET # BLD AUTO: 312 K/UL — SIGNIFICANT CHANGE UP (ref 150–400)
RBC # BLD: 4.13 M/UL — SIGNIFICANT CHANGE UP (ref 3.8–5.2)
RBC # FLD: 17.9 % — HIGH (ref 10.3–14.5)
WBC # BLD: 10.5 K/UL — SIGNIFICANT CHANGE UP (ref 3.8–10.5)
WBC # FLD AUTO: 10.5 K/UL — SIGNIFICANT CHANGE UP (ref 3.8–10.5)

## 2024-11-15 PROCEDURE — 99214 OFFICE O/P EST MOD 30 MIN: CPT

## 2024-11-16 LAB
FERRITIN SERPL-MCNC: 19 NG/ML
FOLATE SERPL-MCNC: 8.8 NG/ML
IRON SATN MFR SERPL: 11 %
IRON SERPL-MCNC: 37 UG/DL
TIBC SERPL-MCNC: 339 UG/DL
UIBC SERPL-MCNC: 303 UG/DL
VIT B12 SERPL-MCNC: 269 PG/ML

## 2024-11-21 ENCOUNTER — APPOINTMENT (OUTPATIENT)
Dept: NEUROLOGY | Facility: CLINIC | Age: 44
End: 2024-11-21
Payer: COMMERCIAL

## 2024-11-21 VITALS
HEIGHT: 60 IN | SYSTOLIC BLOOD PRESSURE: 143 MMHG | WEIGHT: 196 LBS | BODY MASS INDEX: 38.48 KG/M2 | HEART RATE: 80 BPM | DIASTOLIC BLOOD PRESSURE: 73 MMHG

## 2024-11-21 DIAGNOSIS — G44.311 ACUTE POST-TRAUMATIC HEADACHE, INTRACTABLE: ICD-10-CM

## 2024-11-21 DIAGNOSIS — F07.81 POSTCONCUSSIONAL SYNDROME: ICD-10-CM

## 2024-11-21 DIAGNOSIS — H53.8 OTHER VISUAL DISTURBANCES: ICD-10-CM

## 2024-11-21 DIAGNOSIS — R42 DIZZINESS AND GIDDINESS: ICD-10-CM

## 2024-11-21 PROCEDURE — 99205 OFFICE O/P NEW HI 60 MIN: CPT

## 2024-11-21 RX ORDER — METFORMIN HYDROCHLORIDE 1000 MG/1
1000 TABLET, COATED ORAL
Refills: 0 | Status: ACTIVE | COMMUNITY

## 2024-11-21 RX ORDER — MELOXICAM 7.5 MG/1
7.5 TABLET ORAL
Qty: 14 | Refills: 0 | Status: ACTIVE | COMMUNITY
Start: 2024-11-21 | End: 1900-01-01

## 2024-12-02 ENCOUNTER — NON-APPOINTMENT (OUTPATIENT)
Age: 44
End: 2024-12-02

## 2024-12-03 ENCOUNTER — APPOINTMENT (OUTPATIENT)
Dept: MRI IMAGING | Facility: CLINIC | Age: 44
End: 2024-12-03
Payer: COMMERCIAL

## 2024-12-03 ENCOUNTER — OUTPATIENT (OUTPATIENT)
Dept: OUTPATIENT SERVICES | Facility: HOSPITAL | Age: 44
LOS: 1 days | End: 2024-12-03
Payer: COMMERCIAL

## 2024-12-03 DIAGNOSIS — Z00.8 ENCOUNTER FOR OTHER GENERAL EXAMINATION: ICD-10-CM

## 2024-12-03 PROCEDURE — 70551 MRI BRAIN STEM W/O DYE: CPT | Mod: 26

## 2024-12-03 PROCEDURE — 70551 MRI BRAIN STEM W/O DYE: CPT

## 2024-12-04 ENCOUNTER — APPOINTMENT (OUTPATIENT)
Dept: ENDOCRINOLOGY | Facility: CLINIC | Age: 44
End: 2024-12-04
Payer: COMMERCIAL

## 2024-12-04 VITALS
BODY MASS INDEX: 37.89 KG/M2 | DIASTOLIC BLOOD PRESSURE: 68 MMHG | SYSTOLIC BLOOD PRESSURE: 104 MMHG | HEART RATE: 97 BPM | WEIGHT: 193 LBS | HEIGHT: 60 IN

## 2024-12-04 DIAGNOSIS — E03.9 HYPOTHYROIDISM, UNSPECIFIED: ICD-10-CM

## 2024-12-04 DIAGNOSIS — E04.1 NONTOXIC SINGLE THYROID NODULE: ICD-10-CM

## 2024-12-04 LAB — HBA1C MFR BLD HPLC: 5.6

## 2024-12-04 PROCEDURE — 83036 HEMOGLOBIN GLYCOSYLATED A1C: CPT | Mod: QW

## 2024-12-04 PROCEDURE — 99214 OFFICE O/P EST MOD 30 MIN: CPT

## 2025-01-09 ENCOUNTER — APPOINTMENT (OUTPATIENT)
Dept: NEUROLOGY | Facility: CLINIC | Age: 45
End: 2025-01-09

## 2025-02-13 ENCOUNTER — TRANSCRIPTION ENCOUNTER (OUTPATIENT)
Age: 45
End: 2025-02-13

## 2025-02-21 ENCOUNTER — TRANSCRIPTION ENCOUNTER (OUTPATIENT)
Age: 45
End: 2025-02-21

## 2025-03-24 ENCOUNTER — APPOINTMENT (OUTPATIENT)
Dept: BARIATRICS | Facility: CLINIC | Age: 45
End: 2025-03-24
Payer: COMMERCIAL

## 2025-03-24 VITALS
HEIGHT: 60 IN | TEMPERATURE: 98.4 F | DIASTOLIC BLOOD PRESSURE: 83 MMHG | SYSTOLIC BLOOD PRESSURE: 120 MMHG | BODY MASS INDEX: 38.28 KG/M2 | WEIGHT: 195 LBS | OXYGEN SATURATION: 97 % | HEART RATE: 77 BPM

## 2025-03-24 DIAGNOSIS — E55.9 VITAMIN D DEFICIENCY, UNSPECIFIED: ICD-10-CM

## 2025-03-24 DIAGNOSIS — E66.9 OBESITY, UNSPECIFIED: ICD-10-CM

## 2025-03-24 DIAGNOSIS — E04.1 NONTOXIC SINGLE THYROID NODULE: ICD-10-CM

## 2025-03-24 DIAGNOSIS — D64.9 ANEMIA, UNSPECIFIED: ICD-10-CM

## 2025-03-24 DIAGNOSIS — E11.9 TYPE 2 DIABETES MELLITUS W/OUT COMPLICATIONS: ICD-10-CM

## 2025-03-24 DIAGNOSIS — E03.9 HYPOTHYROIDISM, UNSPECIFIED: ICD-10-CM

## 2025-03-24 PROCEDURE — G0447 BEHAVIOR COUNSEL OBESITY 15M: CPT | Mod: 59

## 2025-03-24 PROCEDURE — 99214 OFFICE O/P EST MOD 30 MIN: CPT

## 2025-04-28 ENCOUNTER — TRANSCRIPTION ENCOUNTER (OUTPATIENT)
Age: 45
End: 2025-04-28

## 2025-06-04 RX ORDER — SODIUM SULFATE, POTASSIUM SULFATE AND MAGNESIUM SULFATE 1.6; 3.13; 17.5 G/177ML; G/177ML; G/177ML
17.5-3.13-1.6 SOLUTION ORAL
Qty: 1 | Refills: 0 | Status: ACTIVE | COMMUNITY
Start: 2025-06-04 | End: 1900-01-01

## 2025-06-06 RX ORDER — SODIUM SULFATE, POTASSIUM SULFATE AND MAGNESIUM SULFATE 1.6; 3.13; 17.5 G/177ML; G/177ML; G/177ML
17.5-3.13-1.6 SOLUTION ORAL
Qty: 1 | Refills: 0 | Status: ACTIVE | COMMUNITY
Start: 2025-06-06 | End: 1900-01-01

## 2025-06-10 ENCOUNTER — RESULT REVIEW (OUTPATIENT)
Age: 45
End: 2025-06-10

## 2025-06-10 ENCOUNTER — TRANSCRIPTION ENCOUNTER (OUTPATIENT)
Age: 45
End: 2025-06-10

## 2025-06-10 ENCOUNTER — APPOINTMENT (OUTPATIENT)
Dept: GASTROENTEROLOGY | Facility: HOSPITAL | Age: 45
End: 2025-06-10

## 2025-06-10 ENCOUNTER — OUTPATIENT (OUTPATIENT)
Dept: OUTPATIENT SERVICES | Facility: HOSPITAL | Age: 45
LOS: 1 days | End: 2025-06-10
Payer: COMMERCIAL

## 2025-06-10 VITALS
SYSTOLIC BLOOD PRESSURE: 107 MMHG | DIASTOLIC BLOOD PRESSURE: 64 MMHG | RESPIRATION RATE: 18 BRPM | OXYGEN SATURATION: 99 % | HEART RATE: 56 BPM

## 2025-06-10 VITALS
RESPIRATION RATE: 16 BRPM | TEMPERATURE: 98 F | WEIGHT: 190.04 LBS | SYSTOLIC BLOOD PRESSURE: 122 MMHG | OXYGEN SATURATION: 98 % | HEIGHT: 59 IN | DIASTOLIC BLOOD PRESSURE: 56 MMHG | HEART RATE: 77 BPM

## 2025-06-10 DIAGNOSIS — Z12.11 ENCOUNTER FOR SCREENING FOR MALIGNANT NEOPLASM OF COLON: ICD-10-CM

## 2025-06-10 DIAGNOSIS — R52 PAIN, UNSPECIFIED: ICD-10-CM

## 2025-06-10 LAB
GLUCOSE BLDC GLUCOMTR-MCNC: 107 MG/DL — HIGH (ref 70–99)
HCG UR QL: NEGATIVE — SIGNIFICANT CHANGE UP

## 2025-06-10 PROCEDURE — 88305 TISSUE EXAM BY PATHOLOGIST: CPT | Mod: 26

## 2025-06-10 PROCEDURE — 45385 COLONOSCOPY W/LESION REMOVAL: CPT | Mod: GC

## 2025-06-10 RX ORDER — SODIUM CHLORIDE 9 G/1000ML
1000 INJECTION, SOLUTION INTRAVENOUS
Refills: 0 | Status: DISCONTINUED | OUTPATIENT
Start: 2025-06-10 | End: 2025-06-10

## 2025-06-10 NOTE — BRIEF OPERATIVE NOTE - OPERATION/FINDINGS
Final colonoscopy report  Indication: colon cancer screening, index colonoscopy  - BBPS 9, excellent bowel preparation  - exam to terminal ileum  - withdrawal time 14 minutes    Findings  - one 2 mm sessile ascending colon polyp, removed and retrieved with jumbo forceps  - one 3 mm sessile transverse colon polyp, removed with cold snare. Retrieved.  - normal rectal retroflexion  - normal appearing colon  - normal appearing terminal ileum Final colonoscopy report  Indication: colon cancer screening, index colonoscopy  - BBPS 9, excellent bowel preparation  - exam to terminal ileum  - withdrawal time 14 minutes    Findings  - one 2 mm sessile ascending colon polyp, removed and retrieved with jumbo forceps  - one 3 mm sessile transverse colon polyp, removed with cold snare. Retrieved.  - normal rectal retroflexion  - otherwise normal appearing colon and TI    Recommend:  - follow up pathology results  - repeat colonoscopy in 7 years for surveillance

## 2025-06-10 NOTE — ASU PATIENT PROFILE, ADULT - FALL HARM RISK - FACTORS NURSING JUDGEMENT
Former Dr. Myers PT called to request a Mirena replacement. She states that she had a pap smear with her Primary Care doctor on 12/29. She would like to know if you need to see her in the office prior to starting the prior authorization or if we are able to start that now. Recs requested from Dr. Rosette Sheets office.   No

## 2025-06-10 NOTE — BRIEF OPERATIVE NOTE - COMMENTS
- resume regular diet  - follow up pathology results  - repeat colonoscopy in 7 years for surveillance

## 2025-06-10 NOTE — ASU DISCHARGE PLAN (ADULT/PEDIATRIC) - FINANCIAL ASSISTANCE
[facility] provides services at a reduced cost to those who are determined to be eligible through [facility]’s financial assistance program. Information regarding [facility]’s financial assistance program can be found by going to [email or web address] or by calling [phone].

## 2025-06-10 NOTE — ASU DISCHARGE PLAN (ADULT/PEDIATRIC) - NS MD DC FALL RISK RISK
For information on Fall & Injury Prevention, visit: [email or web address] OR  [email or web address] OR  https://www.cdc.gov/steadi/patient.html

## 2025-06-11 LAB — SURGICAL PATHOLOGY STUDY: SIGNIFICANT CHANGE UP

## 2025-06-12 DIAGNOSIS — D12.2 BENIGN NEOPLASM OF ASCENDING COLON: ICD-10-CM

## 2025-06-12 DIAGNOSIS — D12.3 BENIGN NEOPLASM OF TRANSVERSE COLON: ICD-10-CM

## 2025-06-12 DIAGNOSIS — Z79.890 HORMONE REPLACEMENT THERAPY: ICD-10-CM

## 2025-06-12 DIAGNOSIS — Z79.84 LONG TERM (CURRENT) USE OF ORAL HYPOGLYCEMIC DRUGS: ICD-10-CM

## 2025-06-12 DIAGNOSIS — G47.33 OBSTRUCTIVE SLEEP APNEA (ADULT) (PEDIATRIC): ICD-10-CM

## 2025-06-12 DIAGNOSIS — E06.3 AUTOIMMUNE THYROIDITIS: ICD-10-CM

## 2025-06-12 DIAGNOSIS — Z79.85 LONG-TERM (CURRENT) USE OF INJECTABLE NON-INSULIN ANTIDIABETIC DRUGS: ICD-10-CM

## 2025-06-12 DIAGNOSIS — E11.9 TYPE 2 DIABETES MELLITUS WITHOUT COMPLICATIONS: ICD-10-CM

## 2025-06-12 DIAGNOSIS — E66.9 OBESITY, UNSPECIFIED: ICD-10-CM

## 2025-07-22 ENCOUNTER — APPOINTMENT (OUTPATIENT)
Facility: CLINIC | Age: 45
End: 2025-07-22
Payer: COMMERCIAL

## 2025-07-22 ENCOUNTER — RX RENEWAL (OUTPATIENT)
Age: 45
End: 2025-07-22

## 2025-07-22 ENCOUNTER — NON-APPOINTMENT (OUTPATIENT)
Age: 45
End: 2025-07-22

## 2025-07-22 ENCOUNTER — LABORATORY RESULT (OUTPATIENT)
Age: 45
End: 2025-07-22

## 2025-07-22 VITALS
SYSTOLIC BLOOD PRESSURE: 127 MMHG | HEART RATE: 83 BPM | BODY MASS INDEX: 38.48 KG/M2 | OXYGEN SATURATION: 97 % | HEIGHT: 60 IN | WEIGHT: 196 LBS | DIASTOLIC BLOOD PRESSURE: 75 MMHG

## 2025-07-22 DIAGNOSIS — E04.1 NONTOXIC SINGLE THYROID NODULE: ICD-10-CM

## 2025-07-22 DIAGNOSIS — E55.9 VITAMIN D DEFICIENCY, UNSPECIFIED: ICD-10-CM

## 2025-07-22 DIAGNOSIS — E66.9 OBESITY, UNSPECIFIED: ICD-10-CM

## 2025-07-22 DIAGNOSIS — G47.30 SLEEP APNEA, UNSPECIFIED: ICD-10-CM

## 2025-07-22 PROCEDURE — G0447 BEHAVIOR COUNSEL OBESITY 15M: CPT | Mod: 59

## 2025-07-22 PROCEDURE — 99214 OFFICE O/P EST MOD 30 MIN: CPT

## 2025-07-23 ENCOUNTER — TRANSCRIPTION ENCOUNTER (OUTPATIENT)
Age: 45
End: 2025-07-23

## 2025-07-23 LAB
APPEARANCE: ABNORMAL
BASOPHILS # BLD AUTO: 0.03 K/UL
BASOPHILS NFR BLD AUTO: 0.3 %
BILIRUBIN URINE: NEGATIVE
BLOOD URINE: ABNORMAL
CHOLEST SERPL-MCNC: 171 MG/DL
COLOR: YELLOW
EOSINOPHIL # BLD AUTO: 0.18 K/UL
EOSINOPHIL NFR BLD AUTO: 1.6 %
ESTIMATED AVERAGE GLUCOSE: 131 MG/DL
FERRITIN SERPL-MCNC: 44 NG/ML
FOLATE SERPL-MCNC: 7.1 NG/ML
GLUCOSE QUALITATIVE U: NEGATIVE MG/DL
HBA1C MFR BLD HPLC: 6.2 %
HCT VFR BLD CALC: 34 %
HDLC SERPL-MCNC: 33 MG/DL
HGB BLD-MCNC: 10.1 G/DL
IMM GRANULOCYTES NFR BLD AUTO: 0.3 %
KETONES URINE: NEGATIVE MG/DL
LDLC SERPL-MCNC: 110 MG/DL
LEUKOCYTE ESTERASE URINE: ABNORMAL
LYMPHOCYTES # BLD AUTO: 3.52 K/UL
LYMPHOCYTES NFR BLD AUTO: 31.9 %
MAN DIFF?: NORMAL
MCHC RBC-ENTMCNC: 23.3 PG
MCHC RBC-ENTMCNC: 29.7 G/DL
MCV RBC AUTO: 78.5 FL
MONOCYTES # BLD AUTO: 0.68 K/UL
MONOCYTES NFR BLD AUTO: 6.2 %
NEUTROPHILS # BLD AUTO: 6.59 K/UL
NEUTROPHILS NFR BLD AUTO: 59.7 %
NITRITE URINE: NEGATIVE
NONHDLC SERPL-MCNC: 138 MG/DL
PH URINE: 6
PLATELET # BLD AUTO: 357 K/UL
PROTEIN URINE: 30 MG/DL
RBC # BLD: 4.33 M/UL
RBC # FLD: 15.4 %
SPECIFIC GRAVITY URINE: 1.02
TRIGL SERPL-MCNC: 156 MG/DL
UROBILINOGEN URINE: 0.2 MG/DL
VIT B12 SERPL-MCNC: 231 PG/ML
WBC # FLD AUTO: 11.03 K/UL

## 2025-07-24 ENCOUNTER — TRANSCRIPTION ENCOUNTER (OUTPATIENT)
Age: 45
End: 2025-07-24

## 2025-07-25 ENCOUNTER — TRANSCRIPTION ENCOUNTER (OUTPATIENT)
Age: 45
End: 2025-07-25

## 2025-07-25 LAB
25(OH)D3 SERPL-MCNC: 20.7 NG/ML
ALBUMIN SERPL ELPH-MCNC: 4.3 G/DL
ALBUMIN, RANDOM URINE: 8.6 MG/DL
ALP BLD-CCNC: 79 U/L
ALT SERPL-CCNC: 20 U/L
ANION GAP SERPL CALC-SCNC: 15 MMOL/L
AST SERPL-CCNC: 16 U/L
BILIRUB SERPL-MCNC: 0.4 MG/DL
BUN SERPL-MCNC: 7 MG/DL
CALCIUM SERPL-MCNC: 10 MG/DL
CHLORIDE SERPL-SCNC: 102 MMOL/L
CO2 SERPL-SCNC: 23 MMOL/L
CREAT SERPL-MCNC: 0.7 MG/DL
CREAT SPEC-SCNC: 145 MG/DL
EGFRCR SERPLBLD CKD-EPI 2021: 109 ML/MIN/1.73M2
GLUCOSE SERPL-MCNC: 94 MG/DL
IRON SATN MFR SERPL: 7 %
IRON SERPL-MCNC: 29 UG/DL
MICROALBUMIN/CREAT 24H UR-RTO: 59 MG/G
POTASSIUM SERPL-SCNC: 4.1 MMOL/L
PROT SERPL-MCNC: 7.3 G/DL
SODIUM SERPL-SCNC: 140 MMOL/L
T4 FREE SERPL-MCNC: 1.1 NG/DL
TIBC SERPL-MCNC: 391 UG/DL
TSH SERPL-ACNC: 3.15 UIU/ML
UIBC SERPL-MCNC: 362 UG/DL

## 2025-07-28 ENCOUNTER — TRANSCRIPTION ENCOUNTER (OUTPATIENT)
Age: 45
End: 2025-07-28

## 2025-07-31 ENCOUNTER — APPOINTMENT (OUTPATIENT)
Dept: INTERNAL MEDICINE | Facility: CLINIC | Age: 45
End: 2025-07-31
Payer: COMMERCIAL

## 2025-07-31 VITALS
OXYGEN SATURATION: 97 % | BODY MASS INDEX: 37.3 KG/M2 | WEIGHT: 190 LBS | HEART RATE: 74 BPM | HEIGHT: 60 IN | SYSTOLIC BLOOD PRESSURE: 125 MMHG | DIASTOLIC BLOOD PRESSURE: 90 MMHG

## 2025-07-31 DIAGNOSIS — D64.9 ANEMIA, UNSPECIFIED: ICD-10-CM

## 2025-07-31 DIAGNOSIS — E03.9 HYPOTHYROIDISM, UNSPECIFIED: ICD-10-CM

## 2025-07-31 DIAGNOSIS — D72.829 ELEVATED WHITE BLOOD CELL COUNT, UNSPECIFIED: ICD-10-CM

## 2025-07-31 DIAGNOSIS — R82.90 UNSPECIFIED ABNORMAL FINDINGS IN URINE: ICD-10-CM

## 2025-07-31 DIAGNOSIS — E53.8 DEFICIENCY OF OTHER SPECIFIED B GROUP VITAMINS: ICD-10-CM

## 2025-07-31 DIAGNOSIS — E11.9 TYPE 2 DIABETES MELLITUS W/OUT COMPLICATIONS: ICD-10-CM

## 2025-07-31 PROCEDURE — 99214 OFFICE O/P EST MOD 30 MIN: CPT

## 2025-08-01 ENCOUNTER — TRANSCRIPTION ENCOUNTER (OUTPATIENT)
Age: 45
End: 2025-08-01

## 2025-08-01 LAB
APPEARANCE: CLEAR
BACTERIA: NEGATIVE /HPF
BILIRUBIN URINE: NEGATIVE
BLOOD URINE: NEGATIVE
CALCIUM OXALATE CRYSTALS: PRESENT
CAST: 0 /LPF
COLOR: YELLOW
EPITHELIAL CELLS: 4 /HPF
GLUCOSE QUALITATIVE U: NEGATIVE MG/DL
KETONES URINE: NEGATIVE MG/DL
LEUKOCYTE ESTERASE URINE: ABNORMAL
MICROSCOPIC-UA: NORMAL
NITRITE URINE: NEGATIVE
PH URINE: 5
PROTEIN URINE: NEGATIVE MG/DL
RED BLOOD CELLS URINE: 2 /HPF
REVIEW: NORMAL
SPECIFIC GRAVITY URINE: 1.02
UROBILINOGEN URINE: 0.2 MG/DL
WHITE BLOOD CELLS URINE: 9 /HPF

## 2025-08-03 LAB — BACTERIA UR CULT: NORMAL

## 2025-08-12 ENCOUNTER — APPOINTMENT (OUTPATIENT)
Dept: ENDOCRINOLOGY | Facility: CLINIC | Age: 45
End: 2025-08-12

## 2025-08-28 ENCOUNTER — RX RENEWAL (OUTPATIENT)
Age: 45
End: 2025-08-28

## 2025-09-05 ENCOUNTER — APPOINTMENT (OUTPATIENT)
Dept: ORTHOPEDIC SURGERY | Facility: CLINIC | Age: 45
End: 2025-09-05
Payer: OTHER MISCELLANEOUS

## 2025-09-05 VITALS — WEIGHT: 188 LBS | HEIGHT: 60 IN | BODY MASS INDEX: 36.91 KG/M2

## 2025-09-05 DIAGNOSIS — M77.8 OTHER ENTHESOPATHIES, NOT ELSEWHERE CLASSIFIED: ICD-10-CM

## 2025-09-05 DIAGNOSIS — M75.111 INCOMPLETE ROTATOR CUFF TEAR OR RUPTURE OF RIGHT SHOULDER, NOT SPECIFIED AS TRAUMATIC: ICD-10-CM

## 2025-09-05 PROCEDURE — 99213 OFFICE O/P EST LOW 20 MIN: CPT

## 2025-09-19 ENCOUNTER — RESULT REVIEW (OUTPATIENT)
Age: 45
End: 2025-09-19

## 2025-09-19 ENCOUNTER — APPOINTMENT (OUTPATIENT)
Dept: HEMATOLOGY ONCOLOGY | Facility: CLINIC | Age: 45
End: 2025-09-19

## 2025-09-19 VITALS
TEMPERATURE: 98 F | BODY MASS INDEX: 36.73 KG/M2 | HEART RATE: 72 BPM | SYSTOLIC BLOOD PRESSURE: 119 MMHG | RESPIRATION RATE: 16 BRPM | DIASTOLIC BLOOD PRESSURE: 74 MMHG | WEIGHT: 188.05 LBS | OXYGEN SATURATION: 98 %

## (undated) DEVICE — SNARE EXACTO COLD 9MMX230CM

## (undated) DEVICE — TUBING SUCTION NONCONDUCTIVE 6MM X 12FT

## (undated) DEVICE — ELCTR ECG CONDUCTIVE ADHESIVE

## (undated) DEVICE — Device

## (undated) DEVICE — KIT ENDO PROCEDURE CUST W/VLV

## (undated) DEVICE — TUBING MEDI-VAC W MAXIGRIP CONNECTORS 1/4"X6'

## (undated) DEVICE — POLY TRAP ETRAP

## (undated) DEVICE — TUBING HYBRID CO2

## (undated) DEVICE — BIOPSY FORCEP RADIAL JAW 4 STANDARD WITH NEEDLE

## (undated) DEVICE — GOWN IMPERV BREATHABLE XL

## (undated) DEVICE — TUBING IV SET GRAVITY 3Y 100" MACRO

## (undated) DEVICE — CONTAINER FORMALIN 80ML YELLOW